# Patient Record
Sex: FEMALE | Race: WHITE | NOT HISPANIC OR LATINO | Employment: UNEMPLOYED | URBAN - METROPOLITAN AREA
[De-identification: names, ages, dates, MRNs, and addresses within clinical notes are randomized per-mention and may not be internally consistent; named-entity substitution may affect disease eponyms.]

---

## 2023-08-24 LAB
LV EF: 74 %
LVEF MODALITY: NORMAL

## 2023-10-04 ENCOUNTER — TELEPHONE (OUTPATIENT)
Dept: CARDIOLOGY | Facility: CLINIC | Age: 58
End: 2023-10-04

## 2023-10-04 ENCOUNTER — LAB (OUTPATIENT)
Dept: LAB | Facility: LAB | Age: 58
End: 2023-10-04
Payer: OTHER GOVERNMENT

## 2023-10-04 DIAGNOSIS — I82.409 ACUTE EMBOLISM AND THROMBOSIS OF UNSPECIFIED DEEP VEINS OF UNSPECIFIED LOWER EXTREMITY (MULTI): ICD-10-CM

## 2023-10-04 DIAGNOSIS — I47.10 SUPRAVENTRICULAR TACHYCARDIA, UNSPECIFIED (CMS-HCC): Primary | ICD-10-CM

## 2023-10-04 DIAGNOSIS — R06.02 SHORTNESS OF BREATH: ICD-10-CM

## 2023-10-04 DIAGNOSIS — I51.7 CARDIOMEGALY: ICD-10-CM

## 2023-10-04 DIAGNOSIS — I10 ESSENTIAL (PRIMARY) HYPERTENSION: ICD-10-CM

## 2023-10-04 LAB
ANION GAP SERPL CALC-SCNC: 11 MMOL/L (ref 10–20)
BUN SERPL-MCNC: 22 MG/DL (ref 6–23)
CALCIUM SERPL-MCNC: 9.5 MG/DL (ref 8.6–10.3)
CHLORIDE SERPL-SCNC: 101 MMOL/L (ref 98–107)
CO2 SERPL-SCNC: 27 MMOL/L (ref 21–32)
CREAT SERPL-MCNC: 0.74 MG/DL (ref 0.5–1.05)
ERYTHROCYTE [DISTWIDTH] IN BLOOD BY AUTOMATED COUNT: 12.3 % (ref 11.5–14.5)
GFR SERPL CREATININE-BSD FRML MDRD: >90 ML/MIN/1.73M*2
GLUCOSE SERPL-MCNC: 100 MG/DL (ref 74–99)
HCT VFR BLD AUTO: 38.9 % (ref 36–46)
HGB BLD-MCNC: 13.2 G/DL (ref 12–16)
MAGNESIUM SERPL-MCNC: 1.99 MG/DL (ref 1.6–2.4)
MCH RBC QN AUTO: 30.3 PG (ref 26–34)
MCHC RBC AUTO-ENTMCNC: 33.9 G/DL (ref 32–36)
MCV RBC AUTO: 89 FL (ref 80–100)
NRBC BLD-RTO: 0 /100 WBCS (ref 0–0)
PLATELET # BLD AUTO: 303 X10*3/UL (ref 150–450)
PMV BLD AUTO: 9.6 FL (ref 7.5–11.5)
POTASSIUM SERPL-SCNC: 4.4 MMOL/L (ref 3.5–5.3)
RBC # BLD AUTO: 4.35 X10*6/UL (ref 4–5.2)
SODIUM SERPL-SCNC: 135 MMOL/L (ref 136–145)
TSH SERPL-ACNC: 0.95 MIU/L (ref 0.44–3.98)
WBC # BLD AUTO: 5.8 X10*3/UL (ref 4.4–11.3)

## 2023-10-04 PROCEDURE — 36415 COLL VENOUS BLD VENIPUNCTURE: CPT

## 2023-10-04 NOTE — TELEPHONE ENCOUNTER
Pt calls in requesting if Dr. Rolan Ramires MD, [facility], ordered any labs prior to next OV. Left message on pt's voicemail stating lab orders in system and can be seen by any  facility. Dianne

## 2023-10-06 ENCOUNTER — ANCILLARY PROCEDURE (OUTPATIENT)
Dept: RADIOLOGY | Facility: CLINIC | Age: 58
End: 2023-10-06
Payer: OTHER GOVERNMENT

## 2023-10-06 DIAGNOSIS — I47.10 SUPRAVENTRICULAR TACHYCARDIA (CMS-HCC): ICD-10-CM

## 2023-10-06 DIAGNOSIS — R06.02 SHORTNESS OF BREATH: ICD-10-CM

## 2023-10-06 DIAGNOSIS — E66.3 OVERWEIGHT: ICD-10-CM

## 2023-10-06 DIAGNOSIS — R07.9 CHEST PAIN, UNSPECIFIED: ICD-10-CM

## 2023-10-06 DIAGNOSIS — I82.409 ACUTE EMBOLISM AND THROMBOSIS OF UNSPECIFIED DEEP VEINS OF UNSPECIFIED LOWER EXTREMITY (MULTI): ICD-10-CM

## 2023-10-06 PROCEDURE — 75565 CARD MRI VELOC FLOW MAPPING: CPT | Performed by: INTERNAL MEDICINE

## 2023-10-06 PROCEDURE — A9575 INJ GADOTERATE MEGLUMI 0.1ML: HCPCS | Performed by: INTERNAL MEDICINE

## 2023-10-06 PROCEDURE — 75561 CARDIAC MRI FOR MORPH W/DYE: CPT | Performed by: INTERNAL MEDICINE

## 2023-10-06 PROCEDURE — 75571 CT HRT W/O DYE W/CA TEST: CPT

## 2023-10-06 PROCEDURE — 2550000001 HC RX 255 CONTRASTS: Performed by: INTERNAL MEDICINE

## 2023-10-06 PROCEDURE — 75561 CARDIAC MRI FOR MORPH W/DYE: CPT

## 2023-10-06 RX ORDER — GADOTERATE MEGLUMINE 376.9 MG/ML
30 INJECTION INTRAVENOUS
Status: COMPLETED | OUTPATIENT
Start: 2023-10-06 | End: 2023-10-06

## 2023-10-06 RX ADMIN — GADOTERATE MEGLUMINE 30 ML: 376.9 INJECTION INTRAVENOUS at 09:43

## 2023-10-09 PROBLEM — M76.60 ACHILLES TENDINITIS: Status: ACTIVE | Noted: 2023-10-09

## 2023-10-09 PROBLEM — F41.9 ANXIETY: Status: ACTIVE | Noted: 2023-10-09

## 2023-10-09 PROBLEM — N20.0 NEPHROLITHIASIS: Status: ACTIVE | Noted: 2023-07-26

## 2023-10-09 PROBLEM — N95.0 PMB (POSTMENOPAUSAL BLEEDING): Status: ACTIVE | Noted: 2023-01-10

## 2023-10-09 PROBLEM — N89.8 VAGINAL DRYNESS: Status: ACTIVE | Noted: 2018-09-19

## 2023-10-09 PROBLEM — K21.9 GASTROESOPHAGEAL REFLUX DISEASE WITHOUT ESOPHAGITIS: Status: ACTIVE | Noted: 2019-05-06

## 2023-10-09 PROBLEM — M19.90 ARTHRITIS: Status: ACTIVE | Noted: 2023-10-09

## 2023-10-09 PROBLEM — H93.19 TINNITUS: Status: ACTIVE | Noted: 2023-10-09

## 2023-10-09 PROBLEM — R27.8 MUSCULAR INCOORDINATION: Status: ACTIVE | Noted: 2023-02-09

## 2023-10-09 PROBLEM — H90.3 SENSORINEURAL HEARING LOSS, BILATERAL: Status: ACTIVE | Noted: 2020-10-02

## 2023-10-09 PROBLEM — M24.20 EAGLE'S SYNDROME: Status: ACTIVE | Noted: 2023-10-09

## 2023-10-09 PROBLEM — F41.8 DEPRESSION WITH ANXIETY: Status: ACTIVE | Noted: 2019-05-06

## 2023-10-09 PROBLEM — N92.4 EXCESSIVE BLEEDING IN PREMENOPAUSAL PERIOD: Status: ACTIVE | Noted: 2018-09-19

## 2023-10-09 RX ORDER — OMEPRAZOLE 20 MG/1
1 CAPSULE, DELAYED RELEASE ORAL 2 TIMES DAILY
COMMUNITY
Start: 2023-08-28 | End: 2023-10-19 | Stop reason: SDUPTHER

## 2023-10-09 RX ORDER — APIXABAN 5 MG/1
1 TABLET, FILM COATED ORAL 2 TIMES DAILY
COMMUNITY
Start: 2023-08-27 | End: 2023-10-19 | Stop reason: ALTCHOICE

## 2023-10-09 RX ORDER — DILTIAZEM HYDROCHLORIDE 120 MG/1
120 CAPSULE, COATED, EXTENDED RELEASE ORAL
COMMUNITY
Start: 2023-08-08 | End: 2023-10-19

## 2023-10-09 RX ORDER — SERTRALINE HYDROCHLORIDE 100 MG/1
1 TABLET, FILM COATED ORAL DAILY
COMMUNITY
Start: 2020-05-05

## 2023-10-09 RX ORDER — OXYBUTYNIN CHLORIDE 10 MG/1
1 TABLET, EXTENDED RELEASE ORAL DAILY
COMMUNITY
Start: 2022-12-12 | End: 2023-10-19 | Stop reason: ALTCHOICE

## 2023-10-09 RX ORDER — OMEPRAZOLE 20 MG/1
20 TABLET, DELAYED RELEASE ORAL
COMMUNITY
Start: 2019-01-23 | End: 2023-10-19 | Stop reason: ALTCHOICE

## 2023-10-09 RX ORDER — SODIUM CHLORIDE 0.9 % (FLUSH) 0.9 %
10 SYRINGE (ML) INJECTION
COMMUNITY
Start: 2023-07-03 | End: 2023-10-19 | Stop reason: ALTCHOICE

## 2023-10-09 RX ORDER — ESTRADIOL 0.1 MG/G
CREAM VAGINAL DAILY
COMMUNITY
Start: 2022-11-22 | End: 2023-10-19 | Stop reason: ALTCHOICE

## 2023-10-09 RX ORDER — PREDNISONE 10 MG/1
10 TABLET ORAL
COMMUNITY
Start: 2022-07-29 | End: 2023-10-19 | Stop reason: ALTCHOICE

## 2023-10-10 ENCOUNTER — APPOINTMENT (OUTPATIENT)
Dept: CARDIOLOGY | Facility: CLINIC | Age: 58
End: 2023-10-10
Payer: OTHER GOVERNMENT

## 2023-10-18 PROBLEM — I10 ESSENTIAL HYPERTENSION: Status: ACTIVE | Noted: 2023-10-18

## 2023-10-18 PROBLEM — R07.9 CHEST PAIN: Status: ACTIVE | Noted: 2023-10-18

## 2023-10-18 PROBLEM — R00.2 PALPITATIONS: Status: ACTIVE | Noted: 2023-10-18

## 2023-10-18 PROBLEM — R06.09 DOE (DYSPNEA ON EXERTION): Status: ACTIVE | Noted: 2023-10-18

## 2023-10-18 PROBLEM — I82.462: Status: ACTIVE | Noted: 2023-10-18

## 2023-10-18 PROBLEM — I51.7 RIGHT VENTRICULAR ENLARGEMENT: Status: ACTIVE | Noted: 2023-10-18

## 2023-10-18 PROBLEM — I47.10 SVT (SUPRAVENTRICULAR TACHYCARDIA) (CMS-HCC): Status: ACTIVE | Noted: 2023-10-18

## 2023-10-18 PROBLEM — Z79.01 LONG TERM CURRENT USE OF ANTICOAGULANT THERAPY: Status: ACTIVE | Noted: 2023-10-18

## 2023-10-18 NOTE — PROGRESS NOTES
CARDIOLOGY OFFICE NOTE    Date:   10/19/2023      Patient:    Rosie Sepulveda      YOB: 1965  MRN:    [id number]     Reason for Visit: Follow up for Testing        HPI:     Rosie Sepulveda was seen in cardiac evaluation at the  Cardiology office October 19, 2023.      The patients problems are listed as in the impression above.    Electronic medical records reviewed.    Patient returns.  Is feeling well when she is taking her medication.  She states that she does occasionally miss her diltiazem and notes that she has more shortness of breath and has faster heart rates.  She is exercising regularly otherwise without limitations.    Overall she feels improved.    Patient denies Chest Pain, Lightheadedness, Dizziness, TIA or CVA symptoms.  No CHF or Edema.  No Palpitations.  No GI,  or Bleeding Issues. No Recent Fever or Chills.     Cardiovascular and general review of systems is otherwise negative.    A 14-system review is otherwise negative, other than noted.     .  IMPRESSION:      Cardiovascular status stable  Palpitations  Lightheadedness/dizziness  Dyspnea on exertion  Chest pain, resolved  Vision changes, resolved  Headaches, resolved  Tachycardia, resolved  Paroxysmal supraventricular tachycardia by cardiac monitoring 7/2023  Deep venous thrombosis, left, 4/2023, resolved by vascular ultrasound 8/2023  CT scan of the chest negative for pulmonary embolism 8/2023.  Right ventricular enlargement, mild by CT scan of the chest 8/2023.  Normal by MR cardiac 10/2023.  Long-term anticoagulation, Eliquis  Hypertension  Normal LV systolic function ejection fraction 59%, echocardiogram May 2023  Negative Lexiscan Myoview perfusion study for ischemia or MI, LVEF 74% 8/2023..  Cardiac MR 10/2023.  Negative CT calcium scoring, 1, 10/2023.  Negative pulmonary function study, 8/2023.  Negative carotid ultrasound for significant carotid disease, 8/2023  Anxiety  GERD  Eagle syndrome  Degenerative joint  disease  Ovarian cyst, right  COVID history and vaccinations  Otherwise as per assessment below.      RECOMMENDATIONS:      Patient was reassured overall.  She is encouraged to take her medications more routinely.  We will increase her diltiazem CD to 180 mg daily for better coverage.  We did discuss the possibility of ablation if breakthrough tachycardia occurs.  She understands.    She will continue her other medications.  Refills were provided.    Exercise dietary program.  Hydration.    [employer] portal use was encouraged.    We will plan to see back in 4 months with Laboratory Studies and ECG as noted below.     Patient will follow up with their primary physician for general care.    The patient knows to contact medical care earlier if need be.      ALLERGIES:     Allergies   Allergen Reactions    Amoxicillin Unknown    Trimethoprim Unknown        MEDICATIONS:     Current Outpatient Medications   Medication Instructions    dilTIAZem CD (CARDIZEM CD) 180 mg, oral, Daily    omeprazole (PRILOSEC) 20 mg, oral, 2 times daily    sertraline (Zoloft) 100 mg tablet 1 tablet, oral, Daily       ELECTROCARDIOGRAM:      Sinus rhythm, left ventricular hypertrophy, rate 68.  No acute changes.    CARDIAC TESTING:      Cardiac MR: Normal.  Right ventricle normal size and function.  See full report for details.    CT calcium scorin.    LABORATORY DATA:      No new testing, otherwise as noted below.    All above testing was personally reviewed.    VITALS:     Vitals:    10/19/23 0902   BP: 118/77   Pulse: 69   SpO2: 97%       Wt Readings from Last 4 Encounters:   10/19/23 76.4 kg (168 lb 6.4 oz)   10/06/23 74.8 kg (165 lb)       PHYSICAL EXAMINATION:      General: No acute distress. Vital signs as noted. Alert and oriented.  Head And Neck Examination: No jugular venous distention, no carotid bruits, no mass. Carotid upstrokes preserved. Oral mucosa moist. No xanthelasma. Head and neck examination otherwise unremarkable.  Lungs:  Clear to auscultation and percussion. No wheezes, no rales, and no rhonchi.  Chest: Excursion appeared to be normal. No chest wall tenderness on palpation.  Heart: Normal S1 and S2. No S3. No S4. No rub. Grade 1/6 systolic murmur, best heard at the left sternal border. Point of maximal impulse was within normal limits.  Abdomen: Soft. Nontender. No organomegaly. No bruits. No masses.  Extremities: No bipedal edema. No clubbing. No cyanosis. Pulses are strong throughout. No bruits.  Musculoskeletal Exam: No ulcers, otherwise unremarkable.  Neuro: Neurologically appeared grossly intact.    LAB:     CBC:   Lab Results   Component Value Date    WBC 5.8 10/04/2023    RBC 4.35 10/04/2023    HGB 13.2 10/04/2023    HCT 38.9 10/04/2023     10/04/2023        CMP:    Lab Results   Component Value Date     (L) 10/04/2023    K 4.4 10/04/2023     10/04/2023    CO2 27 10/04/2023    BUN 22 10/04/2023    CREATININE 0.74 10/04/2023    GLUCOSE 100 (H) 10/04/2023    CALCIUM 9.5 10/04/2023       Magnesium:    Lab Results   Component Value Date    MG 1.99 10/04/2023       TSH:    Lab Results   Component Value Date    TSH 0.95 10/04/2023       PROBLEM LIST:     Patient Active Problem List   Diagnosis    Achilles tendinitis    Anxiety    Osteoarthritis    Depression with anxiety    Eagle's syndrome    Excessive bleeding in premenopausal period    Gastroesophageal reflux disease without esophagitis    Muscular incoordination    Nephrolithiasis    PMB (postmenopausal bleeding)    Rectocele    Sensorineural hearing loss, bilateral    Tinnitus    Vaginal dryness    Palpitations    GOMEZ (dyspnea on exertion)    Chest pain    Deep vein thrombosis (DVT) of calf muscle vein of left lower extremity (CMS/HCC)    SVT (supraventricular tachycardia)    Right ventricular enlargement    Essential hypertension    Long term current use of anticoagulant therapy             Rolan Ramires MD, FACC   [facility] / NO /   Cardiology      Of Note:  Dragon voice recognition dictation software was utilized partially in the preparation of this note, therefore, inaccuracies in spelling, word choice and punctuation may have occurred which were not recognized the time of signing.    ----

## 2023-10-19 ENCOUNTER — APPOINTMENT (OUTPATIENT)
Dept: CARDIOLOGY | Facility: CLINIC | Age: 58
End: 2023-10-19
Payer: OTHER GOVERNMENT

## 2023-10-19 ENCOUNTER — OFFICE VISIT (OUTPATIENT)
Dept: CARDIOLOGY | Facility: CLINIC | Age: 58
End: 2023-10-19
Payer: OTHER GOVERNMENT

## 2023-10-19 VITALS
SYSTOLIC BLOOD PRESSURE: 118 MMHG | HEART RATE: 69 BPM | WEIGHT: 168.4 LBS | DIASTOLIC BLOOD PRESSURE: 77 MMHG | HEIGHT: 66 IN | BODY MASS INDEX: 27.06 KG/M2 | OXYGEN SATURATION: 97 %

## 2023-10-19 DIAGNOSIS — M24.20 EAGLE'S SYNDROME: ICD-10-CM

## 2023-10-19 DIAGNOSIS — Z79.01 LONG TERM CURRENT USE OF ANTICOAGULANT THERAPY: ICD-10-CM

## 2023-10-19 DIAGNOSIS — R06.09 DOE (DYSPNEA ON EXERTION): ICD-10-CM

## 2023-10-19 DIAGNOSIS — K21.9 GASTROESOPHAGEAL REFLUX DISEASE WITHOUT ESOPHAGITIS: Primary | ICD-10-CM

## 2023-10-19 DIAGNOSIS — I82.462 DEEP VEIN THROMBOSIS (DVT) OF CALF MUSCLE VEIN OF LEFT LOWER EXTREMITY, UNSPECIFIED CHRONICITY (MULTI): ICD-10-CM

## 2023-10-19 DIAGNOSIS — I47.10 SVT (SUPRAVENTRICULAR TACHYCARDIA) (CMS-HCC): ICD-10-CM

## 2023-10-19 DIAGNOSIS — Z86.16 HISTORY OF COVID-19: ICD-10-CM

## 2023-10-19 DIAGNOSIS — R00.2 PALPITATIONS: ICD-10-CM

## 2023-10-19 DIAGNOSIS — M19.90 OSTEOARTHRITIS, UNSPECIFIED OSTEOARTHRITIS TYPE, UNSPECIFIED SITE: ICD-10-CM

## 2023-10-19 DIAGNOSIS — I51.7 RIGHT VENTRICULAR ENLARGEMENT: ICD-10-CM

## 2023-10-19 DIAGNOSIS — I10 ESSENTIAL HYPERTENSION: ICD-10-CM

## 2023-10-19 DIAGNOSIS — F41.9 ANXIETY: ICD-10-CM

## 2023-10-19 DIAGNOSIS — R07.9 CHEST PAIN, UNSPECIFIED TYPE: ICD-10-CM

## 2023-10-19 DIAGNOSIS — K21.9 GASTROESOPHAGEAL REFLUX DISEASE WITHOUT ESOPHAGITIS: ICD-10-CM

## 2023-10-19 PROCEDURE — 1036F TOBACCO NON-USER: CPT | Performed by: INTERNAL MEDICINE

## 2023-10-19 PROCEDURE — 3078F DIAST BP <80 MM HG: CPT | Performed by: INTERNAL MEDICINE

## 2023-10-19 PROCEDURE — 99214 OFFICE O/P EST MOD 30 MIN: CPT | Performed by: INTERNAL MEDICINE

## 2023-10-19 PROCEDURE — 3074F SYST BP LT 130 MM HG: CPT | Performed by: INTERNAL MEDICINE

## 2023-10-19 PROCEDURE — 93000 ELECTROCARDIOGRAM COMPLETE: CPT | Performed by: INTERNAL MEDICINE

## 2023-10-19 RX ORDER — OMEPRAZOLE 20 MG/1
20 CAPSULE, DELAYED RELEASE ORAL 2 TIMES DAILY
Qty: 180 CAPSULE | Refills: 3 | Status: SHIPPED | OUTPATIENT
Start: 2023-10-19 | End: 2024-10-18

## 2023-10-19 RX ORDER — DILTIAZEM HYDROCHLORIDE 180 MG/1
180 CAPSULE, COATED, EXTENDED RELEASE ORAL DAILY
Qty: 30 CAPSULE | Refills: 11 | Status: SHIPPED | OUTPATIENT
Start: 2023-10-19 | End: 2023-10-19 | Stop reason: SDUPTHER

## 2023-10-19 RX ORDER — DILTIAZEM HYDROCHLORIDE 180 MG/1
180 CAPSULE, COATED, EXTENDED RELEASE ORAL DAILY
Qty: 90 CAPSULE | Refills: 3 | Status: SHIPPED | OUTPATIENT
Start: 2023-10-19 | End: 2023-11-21 | Stop reason: DRUGHIGH

## 2023-10-19 ASSESSMENT — PAIN SCALES - GENERAL: PAINLEVEL: 0-NO PAIN

## 2023-10-19 NOTE — PATIENT INSTRUCTIONS
Exercise diet weight loss program.    Hydrate    Use My Chart portal for reviewing records, testing and contacting office.

## 2023-11-07 ENCOUNTER — TELEPHONE (OUTPATIENT)
Dept: CARDIOLOGY | Facility: CLINIC | Age: 58
End: 2023-11-07

## 2023-11-07 DIAGNOSIS — I10 ESSENTIAL HYPERTENSION: Primary | ICD-10-CM

## 2023-11-07 DIAGNOSIS — R00.2 PALPITATIONS: ICD-10-CM

## 2023-11-07 NOTE — TELEPHONE ENCOUNTER
Patient called and wanted to go back to 120 mg instead of the 180 mg because this mg is causing constipation. Patient also advised the result are still the same. Call back number [phone]

## 2023-11-21 NOTE — TELEPHONE ENCOUNTER
Patient left vmail in [place] office stating she would like to continue on Diltiazem 120 mg daily, RX [facility]  [facility]. Akila Staples MA

## 2023-11-27 RX ORDER — DILTIAZEM HYDROCHLORIDE 120 MG/1
120 CAPSULE, COATED, EXTENDED RELEASE ORAL DAILY
Qty: 30 CAPSULE | Refills: 11 | Status: SHIPPED | OUTPATIENT
Start: 2023-11-27 | End: 2024-02-15 | Stop reason: SDUPTHER

## 2024-01-03 ASSESSMENT — DERMATOLOGY QUALITY OF LIFE (QOL) ASSESSMENT
RATE HOW BOTHERED YOU ARE BY EFFECTS OF YOUR SKIN PROBLEMS ON YOUR ACTIVITIES (EG, GOING OUT, ACCOMPLISHING WHAT YOU WANT, WORK ACTIVITIES OR YOUR RELATIONSHIPS WITH OTHERS): 1
RATE HOW EMOTIONALLY BOTHERED YOU ARE BY YOUR SKIN PROBLEM (FOR EXAMPLE, WORRY, EMBARRASSMENT, FRUSTRATION): 0 - NEVER BOTHERED
RATE HOW BOTHERED YOU ARE BY EFFECTS OF YOUR SKIN PROBLEMS ON YOUR ACTIVITIES (EG, GOING OUT, ACCOMPLISHING WHAT YOU WANT, WORK ACTIVITIES OR YOUR RELATIONSHIPS WITH OTHERS): 1
RATE HOW BOTHERED YOU ARE BY SYMPTOMS OF YOUR SKIN PROBLEM (EG, ITCHING, STINGING BURNING, HURTING OR SKIN IRRITATION): 0 - NEVER BOTHERED
RATE HOW EMOTIONALLY BOTHERED YOU ARE BY YOUR SKIN PROBLEM (FOR EXAMPLE, WORRY, EMBARRASSMENT, FRUSTRATION): 0 - NEVER BOTHERED
DATE THE QUALITY-OF-LIFE ASSESSMENT WAS COMPLETED: 66842
WHAT SINGLE SKIN CONDITION LISTED BELOW IS THE PATIENT ANSWERING THE QUALITY-OF-LIFE ASSESSMENT QUESTIONS ABOUT: NONE OF THE ABOVE
RATE HOW BOTHERED YOU ARE BY SYMPTOMS OF YOUR SKIN PROBLEM (EG, ITCHING, STINGING BURNING, HURTING OR SKIN IRRITATION): 0 - NEVER BOTHERED
WHAT SINGLE SKIN CONDITION LISTED BELOW IS THE PATIENT ANSWERING THE QUALITY-OF-LIFE ASSESSMENT QUESTIONS ABOUT: NONE OF THE ABOVE

## 2024-01-04 ENCOUNTER — OFFICE VISIT (OUTPATIENT)
Dept: DERMATOLOGY | Facility: CLINIC | Age: 59
End: 2024-01-04
Payer: OTHER GOVERNMENT

## 2024-01-04 DIAGNOSIS — L81.4 LENTIGO: Primary | ICD-10-CM

## 2024-01-04 DIAGNOSIS — D18.00 HEMANGIOMA, UNSPECIFIED SITE: ICD-10-CM

## 2024-01-04 DIAGNOSIS — L82.1 SEBORRHEIC KERATOSIS: ICD-10-CM

## 2024-01-04 DIAGNOSIS — Z12.83 ENCOUNTER FOR SCREENING FOR MALIGNANT NEOPLASM OF SKIN: ICD-10-CM

## 2024-01-04 PROCEDURE — 1036F TOBACCO NON-USER: CPT | Performed by: DERMATOLOGY

## 2024-01-04 PROCEDURE — 99203 OFFICE O/P NEW LOW 30 MIN: CPT | Performed by: DERMATOLOGY

## 2024-01-04 ASSESSMENT — DERMATOLOGY PATIENT ASSESSMENT
DO YOU HAVE ANY NEW OR CHANGING LESIONS: YES
DO YOU USE A TANNING BED: YES, PREVIOUSLY
HAVE YOU HAD OR DO YOU HAVE VASCULAR DISEASE: NO
ARE YOU AN ORGAN TRANSPLANT RECIPIENT: NO
HAVE YOU HAD OR DO YOU HAVE A STAPH INFECTION: NO
DO YOU HAVE IRREGULAR MENSTRUAL CYCLES: NO
DO YOU USE SUNSCREEN: OCCASIONALLY
ARE YOU TRYING TO GET PREGNANT: NO
ARE YOU ON BIRTH CONTROL: NO

## 2024-01-04 ASSESSMENT — DERMATOLOGY QUALITY OF LIFE (QOL) ASSESSMENT
DATE THE QUALITY-OF-LIFE ASSESSMENT WAS COMPLETED: 66843
RATE HOW BOTHERED YOU ARE BY SYMPTOMS OF YOUR SKIN PROBLEM (EG, ITCHING, STINGING BURNING, HURTING OR SKIN IRRITATION): 0 - NEVER BOTHERED
WHAT SINGLE SKIN CONDITION LISTED BELOW IS THE PATIENT ANSWERING THE QUALITY-OF-LIFE ASSESSMENT QUESTIONS ABOUT: NONE OF THE ABOVE
ARE THERE EXCLUSIONS OR EXCEPTIONS FOR THE QUALITY OF LIFE ASSESSMENT: NO
RATE HOW BOTHERED YOU ARE BY EFFECTS OF YOUR SKIN PROBLEMS ON YOUR ACTIVITIES (EG, GOING OUT, ACCOMPLISHING WHAT YOU WANT, WORK ACTIVITIES OR YOUR RELATIONSHIPS WITH OTHERS): 1
RATE HOW EMOTIONALLY BOTHERED YOU ARE BY YOUR SKIN PROBLEM (FOR EXAMPLE, WORRY, EMBARRASSMENT, FRUSTRATION): 0 - NEVER BOTHERED

## 2024-01-04 ASSESSMENT — ITCH NUMERIC RATING SCALE: HOW SEVERE IS YOUR ITCHING?: 0

## 2024-01-04 NOTE — PROGRESS NOTES
Subjective   Rosie Sepulveda is a 58 y.o. female who presents for the following: Skin Check.    Skin Cancer Screening  She has a history of moderate sun exposure. She is in the sun occasionally. She uses sunscreen occasionally. She reports no skin symptoms. Her moles are not changing.    Spots that concern her: Face and left shoulder        Objective   Well appearing patient in no apparent distress; mood and affect are within normal limits.    A full examination was performed including scalp, head, eyes, ears, nose, lips, neck, chest, axillae, abdomen, back, buttocks, bilateral upper extremities, bilateral lower extremities, hands, feet, fingers, toes, fingernails, and toenails. All findings within normal limits unless otherwise noted below.    Scattered tan macules in sun-exposed areas.    Stuck on verrucous, tan-brown papules and plaques.      Scattered cherry-red papule(s).      Assessment/Plan   Lentigo    The ABCDEs of melanoma and warning signs of non-melanoma skin cancer were discussed with patient and patient expressed understanding. Sun protection and use of at least SPF 30 discussed with patient. Pt instructed to reapply every 2 hours.     Seborrheic keratosis    Hemangioma, unspecified site    Follow up in 2-3 years or sooner as needed.   Scribe Attestation  By signing my name below, IReshma Scribe   attest that this documentation has been prepared under the direction and in the presence of Bethanie English MD.

## 2024-02-15 ENCOUNTER — OFFICE VISIT (OUTPATIENT)
Dept: CARDIOLOGY | Facility: CLINIC | Age: 59
End: 2024-02-15
Payer: OTHER GOVERNMENT

## 2024-02-15 VITALS
HEART RATE: 71 BPM | WEIGHT: 169 LBS | OXYGEN SATURATION: 98 % | DIASTOLIC BLOOD PRESSURE: 83 MMHG | BODY MASS INDEX: 27.16 KG/M2 | HEIGHT: 66 IN | SYSTOLIC BLOOD PRESSURE: 126 MMHG

## 2024-02-15 DIAGNOSIS — K21.9 GASTROESOPHAGEAL REFLUX DISEASE WITHOUT ESOPHAGITIS: ICD-10-CM

## 2024-02-15 DIAGNOSIS — F41.9 ANXIETY: ICD-10-CM

## 2024-02-15 DIAGNOSIS — Z79.01 LONG TERM CURRENT USE OF ANTICOAGULANT THERAPY: ICD-10-CM

## 2024-02-15 DIAGNOSIS — M24.20 EAGLE'S SYNDROME: ICD-10-CM

## 2024-02-15 DIAGNOSIS — M19.90 OSTEOARTHRITIS, UNSPECIFIED OSTEOARTHRITIS TYPE, UNSPECIFIED SITE: ICD-10-CM

## 2024-02-15 DIAGNOSIS — R06.09 DOE (DYSPNEA ON EXERTION): ICD-10-CM

## 2024-02-15 DIAGNOSIS — R00.2 PALPITATIONS: ICD-10-CM

## 2024-02-15 DIAGNOSIS — I47.10 SVT (SUPRAVENTRICULAR TACHYCARDIA) (CMS-HCC): Primary | ICD-10-CM

## 2024-02-15 DIAGNOSIS — I51.7 RIGHT VENTRICULAR ENLARGEMENT: ICD-10-CM

## 2024-02-15 DIAGNOSIS — I10 ESSENTIAL HYPERTENSION: ICD-10-CM

## 2024-02-15 DIAGNOSIS — R07.9 CHEST PAIN, UNSPECIFIED TYPE: ICD-10-CM

## 2024-02-15 DIAGNOSIS — G56.03 BILATERAL CARPAL TUNNEL SYNDROME: ICD-10-CM

## 2024-02-15 DIAGNOSIS — I82.462 DEEP VEIN THROMBOSIS (DVT) OF CALF MUSCLE VEIN OF LEFT LOWER EXTREMITY, UNSPECIFIED CHRONICITY (MULTI): ICD-10-CM

## 2024-02-15 DIAGNOSIS — K59.00 CONSTIPATION, UNSPECIFIED CONSTIPATION TYPE: ICD-10-CM

## 2024-02-15 DIAGNOSIS — Z86.16 HISTORY OF COVID-19: ICD-10-CM

## 2024-02-15 PROCEDURE — 3074F SYST BP LT 130 MM HG: CPT | Performed by: INTERNAL MEDICINE

## 2024-02-15 PROCEDURE — 93000 ELECTROCARDIOGRAM COMPLETE: CPT | Performed by: INTERNAL MEDICINE

## 2024-02-15 PROCEDURE — 3079F DIAST BP 80-89 MM HG: CPT | Performed by: INTERNAL MEDICINE

## 2024-02-15 PROCEDURE — 99213 OFFICE O/P EST LOW 20 MIN: CPT | Performed by: INTERNAL MEDICINE

## 2024-02-15 PROCEDURE — 1036F TOBACCO NON-USER: CPT | Performed by: INTERNAL MEDICINE

## 2024-02-15 RX ORDER — DOCUSATE SODIUM 100 MG/1
100 CAPSULE, LIQUID FILLED ORAL 2 TIMES DAILY
Qty: 180 CAPSULE | Refills: 3 | Status: SHIPPED | OUTPATIENT
Start: 2024-02-15 | End: 2025-02-14

## 2024-02-15 RX ORDER — DILTIAZEM HYDROCHLORIDE 120 MG/1
120 CAPSULE, COATED, EXTENDED RELEASE ORAL DAILY
Qty: 30 CAPSULE | Refills: 11 | Status: SHIPPED | OUTPATIENT
Start: 2024-02-15 | End: 2025-02-14

## 2024-02-15 NOTE — PROGRESS NOTES
CARDIOLOGY OFFICE NOTE    Date:   2/15/2024    Patient:    Rosie Sepulveda    YOB: 1965    Primary Physician: Yanira Morin MD       Reason for Visit: 4 month follow up       HPI:     Rosie Sepulveda was seen in cardiac evaluation at the  Cardiology office February 15, 2024.      The patients problems are listed as in the impression below.    Electronic medical records reviewed.    Patient returns.  Overall she states that she feels well.  She has resolution for the most part of her palpitations.  She only has them occasionally.  She does have some constipation with her diltiazem.  She tried to come off of the diltiazem and was actually on a higher dose of 180 but had recurrent symptomatology.  She restarted the 120 diltiazem CD and has done well overall.  She still has some constipation but is changing her diet and accommodating.    She has no other cardiovascular complaints.    Patient denies Chest Pain, SOB, Lightheadedness, Dizziness, TIA or CVA symptoms.  No CHF or Edema.  No GI,  or Bleeding Issues. No Recent Fever or Chills.     Cardiovascular and general review of systems is otherwise negative.    A 14-system review is otherwise negative, other than noted.     PHYSICAL EXAMINATION:      Vitals:    02/15/24 0755   BP: 126/83   Pulse: 71   SpO2: 98%     General: No acute distress. Vital signs as noted. Alert and oriented.  Head And Neck Examination: No jugular venous distention, no carotid bruits, no mass. Carotid upstrokes preserved. Oral mucosa moist. No xanthelasma. Head and neck examination otherwise unremarkable.  Lungs: Clear to auscultation and percussion. No wheezes, no rales, and no rhonchi.  Chest: Excursion appeared to be normal. No chest wall tenderness on palpation.  Heart: Normal S1 and S2. No S3. No S4. No rub. Grade 1/6 systolic murmur, best heard at the left sternal border. Point of maximal impulse was within normal limits.  Abdomen: Soft. Nontender. No  organomegaly. No bruits. No masses.  Extremities: No bipedal edema. No clubbing. No cyanosis. Pulses are strong throughout. No bruits.  Musculoskeletal Exam: No ulcers, otherwise unremarkable.  Neuro: Neurologically appeared grossly intact.    IMPRESSION:      Cardiovascular status stable  Palpitations  Paroxysmal supraventricular tachycardia by cardiac monitoring 7/2023  Deep venous thrombosis, left, 4/2023, resolved by vascular ultrasound 8/2023  CT scan of the chest negative for pulmonary embolism 8/2023.  Right ventricular enlargement, mild by CT scan of the chest 8/2023.  Normal by MR cardiac 10/2023.  Hypertension  Normal LV systolic function ejection fraction 59%, echocardiogram May 2023  Negative Lexiscan Myoview perfusion study for ischemia or MI, LVEF 74% 8/2023..  Cardiac MR 10/2023.  Negative CT calcium scoring, 1, 10/2023.  Negative pulmonary function study, 8/2023.  Negative carotid ultrasound for significant carotid disease, 8/2023  Anxiety  GERD  Constipation  Eagle syndrome  Degenerative joint disease  Carpal tunnel disease, mild, bilateral  Ovarian cyst, right  COVID history and vaccinations  Otherwise as per assessment below.    RECOMMENDATIONS:      Rosie overall states that she feels better.  Is tolerating her current dose of diltiazem with the exception of some mild constipation.  Would suggest adding Colace 100 mg twice daily and increasing her fiber and hydration in her diet.  She will continue her diltiazem 120 mg CD daily.  Refills were provided.    Exercise dietary program was encouraged.  Hydration.    [employer] portal use was encouraged.    We will plan to see back in 1 year with Laboratory Studies and ECG as ordered.     Patient will follow up with their primary physician for general care.    The patient knows to contact medical care earlier if need be.      ALLERGIES:     Allergies   Allergen Reactions    Amoxicillin Unknown    Trimethoprim Unknown        MEDICATIONS:     Current  Outpatient Medications   Medication Instructions    dilTIAZem CD (CARDIZEM CD) 120 mg, oral, Daily    omeprazole (PRILOSEC) 20 mg, oral, 2 times daily    sertraline (Zoloft) 100 mg tablet 1 tablet, oral, Daily       ELECTROCARDIOGRAM:      Sinus rhythm, rate 71.  No acute changes.    CARDIAC TESTING:      None    LABORATORY DATA:      CBC:   Lab Results   Component Value Date    WBC 5.8 10/04/2023    RBC 4.35 10/04/2023    HGB 13.2 10/04/2023    HCT 38.9 10/04/2023     10/04/2023        CMP:    Lab Results   Component Value Date     (L) 10/04/2023    K 4.4 10/04/2023     10/04/2023    CO2 27 10/04/2023    BUN 22 10/04/2023    CREATININE 0.74 10/04/2023    GLUCOSE 100 (H) 10/04/2023    CALCIUM 9.5 10/04/2023     Magnesium:    Lab Results   Component Value Date    MG 1.99 10/04/2023     TSH:    Lab Results   Component Value Date    TSH 0.95 10/04/2023                 PROBLEM LIST:     Patient Active Problem List   Diagnosis    Achilles tendinitis    Anxiety    Osteoarthritis    Depression with anxiety    Eagle's syndrome    Excessive bleeding in premenopausal period    Gastroesophageal reflux disease without esophagitis    Muscular incoordination    Nephrolithiasis    PMB (postmenopausal bleeding)    Rectocele    Sensorineural hearing loss, bilateral    Tinnitus    Vaginal dryness    Palpitations    GOMEZ (dyspnea on exertion)    Chest pain    Deep vein thrombosis (DVT) of calf muscle vein of left lower extremity (CMS/HCC)    SVT (supraventricular tachycardia)    Right ventricular enlargement    Essential hypertension    Long term current use of anticoagulant therapy             Rolan Ramires MD, FACC   [facility] /  Cardiology      Of Note:  [employer] voice recognition dictation software was utilized partially in the preparation of this note, therefore, inaccuracies in spelling, word choice and punctuation may have occurred which were not recognized the time of signing.    Patient was seen  and examined with total time of visit including chart preparation, rooming, and chart completion exceeding 40 minutes.      ----

## 2024-03-06 ENCOUNTER — TELEPHONE (OUTPATIENT)
Dept: CARDIOLOGY | Facility: CLINIC | Age: 59
End: 2024-03-06
Payer: OTHER GOVERNMENT

## 2024-03-06 NOTE — TELEPHONE ENCOUNTER
Patient called stating she was prescribed Prednisone 4 mg pack for 7 days. Patient read online that it can cause a interaction with Diltiazem. Patient is asking if it is ok to take both at the same time. Please advise.   Akila Staples MA

## 2024-04-03 ENCOUNTER — APPOINTMENT (OUTPATIENT)
Dept: PREADMISSION TESTING | Facility: HOSPITAL | Age: 59
End: 2024-04-03
Payer: OTHER GOVERNMENT

## 2024-04-04 ENCOUNTER — APPOINTMENT (OUTPATIENT)
Dept: PREADMISSION TESTING | Facility: HOSPITAL | Age: 59
End: 2024-04-04
Payer: OTHER GOVERNMENT

## 2024-04-05 RX ORDER — FLUTICASONE PROPIONATE 50 MCG
1 SPRAY, SUSPENSION (ML) NASAL DAILY
COMMUNITY

## 2024-04-05 RX ORDER — LORATADINE 10 MG/1
10 TABLET ORAL DAILY
COMMUNITY

## 2024-04-05 ASSESSMENT — CHADS2 SCORE
PRIOR STROKE OR TIA OR THROMBOEMBOLISM: NO
CHF: NO
CHADS2 SCORE: 0
AGE GREATER THAN OR EQUAL TO 75: NO
HYPERTENSION: NO
DIABETES: NO

## 2024-04-05 ASSESSMENT — DUKE ACTIVITY SCORE INDEX (DASI)
CAN YOU DO YARD WORK LIKE RAKING LEAVES, WEEDING OR PUSHING A MOWER: YES
CAN YOU PARTICIPATE IN MODERATE RECREATIONAL ACTIVITIES LIKE GOLF, BOWLING, DANCING, DOUBLES TENNIS OR THROWING A BASEBALL OR FOOTBALL: YES
CAN YOU DO LIGHT WORK AROUND THE HOUSE LIKE DUSTING OR WASHING DISHES: YES
CAN YOU WALK A BLOCK OR TWO ON LEVEL GROUND: YES
CAN YOU CLIMB A FLIGHT OF STAIRS OR WALK UP A HILL: YES
CAN YOU WALK INDOORS, SUCH AS AROUND YOUR HOUSE: YES
DASI METS SCORE: 9.9
CAN YOU PARTICIPATE IN STRENOUS SPORTS LIKE SWIMMING, SINGLES TENNIS, FOOTBALL, BASKETBALL, OR SKIING: YES
TOTAL_SCORE: 58.2
CAN YOU RUN A SHORT DISTANCE: YES
CAN YOU DO MODERATE WORK AROUND THE HOUSE LIKE VACUUMING, SWEEPING FLOORS OR CARRYING GROCERIES: YES
CAN YOU HAVE SEXUAL RELATIONS: YES
CAN YOU TAKE CARE OF YOURSELF (EAT, DRESS, BATHE, OR USE TOILET): YES
CAN YOU DO HEAVY WORK AROUND THE HOUSE LIKE SCRUBBING FLOORS OR LIFTING AND MOVING HEAVY FURNITURE: YES

## 2024-04-05 ASSESSMENT — LIFESTYLE VARIABLES: SMOKING_STATUS: NONSMOKER

## 2024-04-05 ASSESSMENT — ACTIVITIES OF DAILY LIVING (ADL): ADL_SCORE: 0

## 2024-04-08 ENCOUNTER — LAB (OUTPATIENT)
Dept: LAB | Facility: LAB | Age: 59
End: 2024-04-08
Payer: OTHER GOVERNMENT

## 2024-04-08 ENCOUNTER — PRE-ADMISSION TESTING (OUTPATIENT)
Dept: PREADMISSION TESTING | Facility: HOSPITAL | Age: 59
End: 2024-04-08
Payer: OTHER GOVERNMENT

## 2024-04-08 VITALS
WEIGHT: 166.45 LBS | DIASTOLIC BLOOD PRESSURE: 83 MMHG | HEART RATE: 73 BPM | SYSTOLIC BLOOD PRESSURE: 138 MMHG | RESPIRATION RATE: 16 BRPM | BODY MASS INDEX: 26.75 KG/M2 | TEMPERATURE: 97.2 F | OXYGEN SATURATION: 97 % | HEIGHT: 66 IN

## 2024-04-08 DIAGNOSIS — M75.102 ROTATOR CUFF TEAR ARTHROPATHY OF LEFT SHOULDER: ICD-10-CM

## 2024-04-08 DIAGNOSIS — R07.9 CHEST PAIN, UNSPECIFIED TYPE: ICD-10-CM

## 2024-04-08 DIAGNOSIS — F41.9 ANXIETY: ICD-10-CM

## 2024-04-08 DIAGNOSIS — M24.20 EAGLE'S SYNDROME: ICD-10-CM

## 2024-04-08 DIAGNOSIS — I82.462 DEEP VEIN THROMBOSIS (DVT) OF CALF MUSCLE VEIN OF LEFT LOWER EXTREMITY, UNSPECIFIED CHRONICITY (MULTI): ICD-10-CM

## 2024-04-08 DIAGNOSIS — K21.9 GASTROESOPHAGEAL REFLUX DISEASE WITHOUT ESOPHAGITIS: ICD-10-CM

## 2024-04-08 DIAGNOSIS — I10 ESSENTIAL HYPERTENSION: ICD-10-CM

## 2024-04-08 DIAGNOSIS — I51.7 RIGHT VENTRICULAR ENLARGEMENT: ICD-10-CM

## 2024-04-08 DIAGNOSIS — M12.812 ROTATOR CUFF TEAR ARTHROPATHY OF LEFT SHOULDER: ICD-10-CM

## 2024-04-08 DIAGNOSIS — Z01.818 PRE-OP TESTING: ICD-10-CM

## 2024-04-08 DIAGNOSIS — R00.2 PALPITATIONS: ICD-10-CM

## 2024-04-08 DIAGNOSIS — Z86.16 HISTORY OF COVID-19: ICD-10-CM

## 2024-04-08 DIAGNOSIS — Z01.818 PRE-OP TESTING: Primary | ICD-10-CM

## 2024-04-08 DIAGNOSIS — R06.09 DOE (DYSPNEA ON EXERTION): ICD-10-CM

## 2024-04-08 DIAGNOSIS — I47.10 SVT (SUPRAVENTRICULAR TACHYCARDIA) (CMS-HCC): ICD-10-CM

## 2024-04-08 DIAGNOSIS — M19.90 OSTEOARTHRITIS, UNSPECIFIED OSTEOARTHRITIS TYPE, UNSPECIFIED SITE: ICD-10-CM

## 2024-04-08 DIAGNOSIS — Z79.01 LONG TERM CURRENT USE OF ANTICOAGULANT THERAPY: ICD-10-CM

## 2024-04-08 LAB
ALBUMIN SERPL BCP-MCNC: 4.4 G/DL (ref 3.4–5)
ALP SERPL-CCNC: 57 U/L (ref 33–110)
ALT SERPL W P-5'-P-CCNC: 13 U/L (ref 7–45)
ANION GAP SERPL CALC-SCNC: 11 MMOL/L (ref 10–20)
AST SERPL W P-5'-P-CCNC: 15 U/L (ref 9–39)
BILIRUB DIRECT SERPL-MCNC: 0.1 MG/DL (ref 0–0.3)
BILIRUB SERPL-MCNC: 0.4 MG/DL (ref 0–1.2)
BUN SERPL-MCNC: 21 MG/DL (ref 6–23)
CALCIUM SERPL-MCNC: 9.4 MG/DL (ref 8.6–10.3)
CHLORIDE SERPL-SCNC: 103 MMOL/L (ref 98–107)
CHOLEST SERPL-MCNC: 213 MG/DL (ref 0–199)
CHOLESTEROL/HDL RATIO: 3.5
CO2 SERPL-SCNC: 28 MMOL/L (ref 21–32)
CREAT SERPL-MCNC: 0.69 MG/DL (ref 0.5–1.05)
EGFRCR SERPLBLD CKD-EPI 2021: >90 ML/MIN/1.73M*2
ERYTHROCYTE [DISTWIDTH] IN BLOOD BY AUTOMATED COUNT: 12.3 % (ref 11.5–14.5)
GLUCOSE SERPL-MCNC: 89 MG/DL (ref 74–99)
HCT VFR BLD AUTO: 39.5 % (ref 36–46)
HDLC SERPL-MCNC: 61.7 MG/DL
HGB BLD-MCNC: 13.3 G/DL (ref 12–16)
LDLC SERPL CALC-MCNC: 133 MG/DL
MAGNESIUM SERPL-MCNC: 1.98 MG/DL (ref 1.6–2.4)
MCH RBC QN AUTO: 29.9 PG (ref 26–34)
MCHC RBC AUTO-ENTMCNC: 33.7 G/DL (ref 32–36)
MCV RBC AUTO: 89 FL (ref 80–100)
NON HDL CHOLESTEROL: 151 MG/DL (ref 0–149)
NRBC BLD-RTO: 0 /100 WBCS (ref 0–0)
PLATELET # BLD AUTO: 318 X10*3/UL (ref 150–450)
POTASSIUM SERPL-SCNC: 4.2 MMOL/L (ref 3.5–5.3)
PROT SERPL-MCNC: 7 G/DL (ref 6.4–8.2)
RBC # BLD AUTO: 4.45 X10*6/UL (ref 4–5.2)
SODIUM SERPL-SCNC: 138 MMOL/L (ref 136–145)
TRIGL SERPL-MCNC: 91 MG/DL (ref 0–149)
TSH SERPL-ACNC: 1.52 MIU/L (ref 0.44–3.98)
VLDL: 18 MG/DL (ref 0–40)
WBC # BLD AUTO: 4.6 X10*3/UL (ref 4.4–11.3)

## 2024-04-08 PROCEDURE — 80061 LIPID PANEL: CPT

## 2024-04-08 PROCEDURE — 36415 COLL VENOUS BLD VENIPUNCTURE: CPT

## 2024-04-08 PROCEDURE — 99203 OFFICE O/P NEW LOW 30 MIN: CPT | Performed by: NURSE PRACTITIONER

## 2024-04-08 PROCEDURE — 82248 BILIRUBIN DIRECT: CPT

## 2024-04-08 PROCEDURE — 80053 COMPREHEN METABOLIC PANEL: CPT

## 2024-04-08 PROCEDURE — 84443 ASSAY THYROID STIM HORMONE: CPT

## 2024-04-08 PROCEDURE — 83735 ASSAY OF MAGNESIUM: CPT

## 2024-04-08 PROCEDURE — 85027 COMPLETE CBC AUTOMATED: CPT

## 2024-04-08 RX ORDER — AZITHROMYCIN 250 MG/1
250 TABLET, FILM COATED ORAL DAILY
COMMUNITY

## 2024-04-08 ASSESSMENT — PAIN - FUNCTIONAL ASSESSMENT: PAIN_FUNCTIONAL_ASSESSMENT: 0-10

## 2024-04-08 ASSESSMENT — PAIN SCALES - GENERAL: PAINLEVEL_OUTOF10: 0 - NO PAIN

## 2024-04-08 NOTE — CPM/PAT H&P
"CPM/PAT Evaluation       Name: Rosie Sepulveda (Rosie Sepulveda)  /Age: 1965/58 y.o.     In-Person       Chief Complaint: left shoulder pains    HPI    MM is a 59 yo female who injured her left shoulder in 2024 and imaging shows left shoulder rotator cuff tear.  Subsequently she is scheduled for left shoulder arthroscopy surgery with debridement.  Denies any recent steroid injections.  Skin is intact to surgical limb.  She endorses limited range of motion which causes pain with certain movements. Otherwise denies any recent illness, fever/chills, chest pains or shortness of breath.    Of note, with recent travel she developed left ear \"hot\" in her sensation in which she presented to an urgent care this past Saturday and was prescribed an oral antibiotic for possible ear infection.  She continues to have this odd sensation in the left ear which is relieved with consistent use of nasal spray and head tilting to the left-denies any drainage or pain.  She will be calling the surgeon's office today and sharing this information.    Past Medical History:   Diagnosis Date    Anxiety     Arrhythmia     SVT    DVT (deep venous thrombosis) (CMS/[facility])     GERD (gastroesophageal reflux disease)     Nephrolithiasis     PONV (postoperative nausea and vomiting)     Skin tag      PCP: Dr. Klein  Cards: Dr. Ramires  Vasc: Dr. Tavera (discharged)    Stress test   Normal Lexiscan Myoview cardiac perfusion stress test.  No evidence of ischemia or myocardial infarction by perfusion imaging.  Normal left ventricular systolic function, ejection fraction 74%.  None invasive risk stratification is low risk.    Past Surgical History:   Procedure Laterality Date     SECTION, LOW TRANSVERSE      COLONOSCOPY      DILATION AND CURETTAGE OF UTERUS      KNEE SURGERY      arthroscopic    SHOULDER SURGERY         Patient  reports being sexually active and has had partner(s) who are male. She reports using the " "following method of birth control/protection: Male Sterilization.    Family History   Problem Relation Name Age of Onset    Stroke Mother Helen     Hypertension Mother Helen     Esophageal cancer Father         Allergies   Allergen Reactions    Amoxicillin Itching    Trimethoprim Hives       Prior to Admission medications    Medication Sig Start Date End Date Taking? Authorizing Provider   azithromycin (Zithromax) 250 mg tablet Take 1 tablet (250 mg) by mouth once daily.    Historical Provider, MD   dilTIAZem CD (Cardizem CD) 120 mg 24 hr capsule Take 1 capsule (120 mg) by mouth once daily. 2/15/24 2/14/25  Rolan Ramires MD   docusate sodium (Colace) 100 mg capsule Take 1 capsule (100 mg) by mouth 2 times a day.  Patient not taking: Reported on 4/5/2024 2/15/24 2/14/25  Rolan Ramires MD   fluticasone (Flonase) 50 mcg/actuation nasal spray Administer 1 spray into each nostril once daily. Shake gently. Before first use, prime pump. After use, clean tip and replace cap.    Historical Provider, MD   loratadine (Claritin) 10 mg tablet Take 1 tablet (10 mg) by mouth once daily.    Historical Provider, MD   omeprazole (PriLOSEC) 20 mg DR capsule Take 1 capsule (20 mg) by mouth 2 times a day. 10/19/23 10/18/24  Rolan Ramires MD   sertraline (Zoloft) 100 mg tablet Take 1 tablet (100 mg) by mouth once daily. 5/5/20   Historical Provider, MD TIERRA CURTIS   Constitutional: Negative for fever, chills, or sweats   ENMT: Negative for nasal discharge, congestion, ear pain, mouth pain, throat pain; positive for left ear inner \"hot\" sensation     Respiratory: Negative for cough, wheezing, shortness of breath   Cardiac: Negative for chest pain, dyspnea on exertion, palpitations   Gastrointestinal: Negative for nausea, vomiting, diarrhea, constipation, abdominal pain  Genitourinary: Negative for dysuria, flank pain, frequency, hematuria     Musculoskeletal: Negative swelling, weakness; positive limited ROM to " left shoulder with pain intermittently    Neurological: Negative for dizziness, confusion, headache  Psychiatric: Negative for mood changes   Skin: Negative for itching, rash, ulcer    Hematologic/Lymph: Negative for bruising, easy bleeding  Allergic/Immunologic: Negative itching, sneezing, swelling      Physical Exam  HENT:      Head: Normocephalic.      Right Ear: Tympanic membrane and ear canal normal.      Left Ear: Tympanic membrane and ear canal normal. No swelling. Tympanic membrane is not bulging.      Mouth/Throat:      Mouth: Mucous membranes are moist.   Eyes:      Extraocular Movements: Extraocular movements intact.   Cardiovascular:      Rate and Rhythm: Normal rate and regular rhythm.   Pulmonary:      Effort: Pulmonary effort is normal.      Breath sounds: Normal breath sounds.   Abdominal:      General: Abdomen is flat.      Palpations: Abdomen is soft.   Musculoskeletal:      Left shoulder: Decreased range of motion.      Cervical back: Normal range of motion.   Skin:     General: Skin is warm and dry.   Neurological:      General: No focal deficit present.      Mental Status: She is alert.   Psychiatric:         Mood and Affect: Mood normal.        PAT AIRWAY:   Airway:     Neck ROM::  Full  normal      Anesthesia:  Patient endorses PONV       Visit Vitals  /83   Pulse 73   Temp 36.2 °C (97.2 °F) (Temporal)   Resp 16       DASI Risk Score      Flowsheet Row Most Recent Value   DASI SCORE 58.2   METS Score (Will be calculated only when all the questions are answered) 9.9          Caprini DVT Assessment      Flowsheet Row Most Recent Value   DVT Score 8   Current Status Major surgery planned, including arthroscopic and laproscopic (1-2 hours)   History Prior major surgery, SVT, DVT/PE   Age 40-59 years   BMI 30 or less          Modified Frailty Index      Flowsheet Row Most Recent Value   Modified Frailty Index Calculator 0          CHADS2 Stroke Risk  Current as of 16 minutes ago        N/A 3  "- 100%: High Risk   2 - 3%: Medium Risk   0 - 2%: Low Risk     Last Change: N/A          This score determines the patient's risk of having a stroke if the patient has atrial fibrillation.        This score is not applicable to this patient. Components are not calculated.          Revised Cardiac Risk Index    No data to display       Apfel Simplified Score    No data to display       Risk Analysis Index Results This Encounter         4/5/2024  1450             GARAY Cancer History: Patient does not indicate history of cancer    Total Risk Analysis Index Score Without Cancer: 16    Total Risk Analysis Index Score: 16          Stop Bang Score      Flowsheet Row Most Recent Value   Do you snore loudly? 1   Do you often feel tired or fatigued after your sleep? 0   Has anyone ever observed you stop breathing in your sleep? 0   Do you have or are you being treated for high blood pressure? 0   Recent BMI (Calculated) 27.3   Is BMI greater than 35 kg/m2? 0=No   Age older than 50 years old? 1=Yes   Is your neck circumference greater than 17 inches (Male) or 16 inches (Female)? 0   Gender - Male 0=No   STOP-BANG Total Score 2            Assessment and Plan:     58-year-old female scheduled for left shoulder arthroscopy surgery with debridement tomorrow on 4/9/2024 with Dr. Saunders. BMP ordered. Recent EKG on file from Feb 2024. Otherwise no further orders indicated.     Of note, with recent travel she developed left ear \"hot\" in her sensation in which she presented to an urgent care this past Saturday and was prescribed an oral antibiotic for possible ear infection.  She continues to have this odd sensation in the left ear which is relieved with consistent use of nasal spray and head tilting to the left-denies any drainage or pain.  She will be calling the surgeon's office today and sharing this information.    Anesthesia:  Patient endorses PONV     See risk scores as previously documented.       "

## 2024-04-08 NOTE — H&P (VIEW-ONLY)
"CPM/PAT Evaluation       Name: Rosie Sepulveda (Rosie Sepulveda)  /Age: 1965/58 y.o.     In-Person       Chief Complaint: left shoulder pains    HPI    MM is a 59 yo female who injured her left shoulder in 2024 and imaging shows left shoulder rotator cuff tear.  Subsequently she is scheduled for left shoulder arthroscopy surgery with debridement.  Denies any recent steroid injections.  Skin is intact to surgical limb.  She endorses limited range of motion which causes pain with certain movements. Otherwise denies any recent illness, fever/chills, chest pains or shortness of breath.    Of note, with recent travel she developed left ear \"hot\" in her sensation in which she presented to an urgent care this past Saturday and was prescribed an oral antibiotic for possible ear infection.  She continues to have this odd sensation in the left ear which is relieved with consistent use of nasal spray and head tilting to the left-denies any drainage or pain.  She will be calling the surgeon's office today and sharing this information.    Past Medical History:   Diagnosis Date    Anxiety     Arrhythmia     SVT    DVT (deep venous thrombosis) (CMS/[facility])     GERD (gastroesophageal reflux disease)     Nephrolithiasis     PONV (postoperative nausea and vomiting)     Skin tag      PCP: Dr. Klein  Cards: Dr. Ramires  Vasc: Dr. Tavera (discharged)    Stress test   Normal Lexiscan Myoview cardiac perfusion stress test.  No evidence of ischemia or myocardial infarction by perfusion imaging.  Normal left ventricular systolic function, ejection fraction 74%.  None invasive risk stratification is low risk.    Past Surgical History:   Procedure Laterality Date     SECTION, LOW TRANSVERSE      COLONOSCOPY      DILATION AND CURETTAGE OF UTERUS      KNEE SURGERY      arthroscopic    SHOULDER SURGERY         Patient  reports being sexually active and has had partner(s) who are male. She reports using the " "following method of birth control/protection: Male Sterilization.    Family History   Problem Relation Name Age of Onset    Stroke Mother Helen     Hypertension Mother Helen     Esophageal cancer Father         Allergies   Allergen Reactions    Amoxicillin Itching    Trimethoprim Hives       Prior to Admission medications    Medication Sig Start Date End Date Taking? Authorizing Provider   azithromycin (Zithromax) 250 mg tablet Take 1 tablet (250 mg) by mouth once daily.    Historical Provider, MD   dilTIAZem CD (Cardizem CD) 120 mg 24 hr capsule Take 1 capsule (120 mg) by mouth once daily. 2/15/24 2/14/25  Rolan Ramires MD   docusate sodium (Colace) 100 mg capsule Take 1 capsule (100 mg) by mouth 2 times a day.  Patient not taking: Reported on 4/5/2024 2/15/24 2/14/25  Rolan Ramires MD   fluticasone (Flonase) 50 mcg/actuation nasal spray Administer 1 spray into each nostril once daily. Shake gently. Before first use, prime pump. After use, clean tip and replace cap.    Historical Provider, MD   loratadine (Claritin) 10 mg tablet Take 1 tablet (10 mg) by mouth once daily.    Historical Provider, MD   omeprazole (PriLOSEC) 20 mg DR capsule Take 1 capsule (20 mg) by mouth 2 times a day. 10/19/23 10/18/24  Rolan Ramires MD   sertraline (Zoloft) 100 mg tablet Take 1 tablet (100 mg) by mouth once daily. 5/5/20   Historical Provider, MD TIERRA CURTIS   Constitutional: Negative for fever, chills, or sweats   ENMT: Negative for nasal discharge, congestion, ear pain, mouth pain, throat pain; positive for left ear inner \"hot\" sensation     Respiratory: Negative for cough, wheezing, shortness of breath   Cardiac: Negative for chest pain, dyspnea on exertion, palpitations   Gastrointestinal: Negative for nausea, vomiting, diarrhea, constipation, abdominal pain  Genitourinary: Negative for dysuria, flank pain, frequency, hematuria     Musculoskeletal: Negative swelling, weakness; positive limited ROM to " left shoulder with pain intermittently    Neurological: Negative for dizziness, confusion, headache  Psychiatric: Negative for mood changes   Skin: Negative for itching, rash, ulcer    Hematologic/Lymph: Negative for bruising, easy bleeding  Allergic/Immunologic: Negative itching, sneezing, swelling      Physical Exam  HENT:      Head: Normocephalic.      Right Ear: Tympanic membrane and ear canal normal.      Left Ear: Tympanic membrane and ear canal normal. No swelling. Tympanic membrane is not bulging.      Mouth/Throat:      Mouth: Mucous membranes are moist.   Eyes:      Extraocular Movements: Extraocular movements intact.   Cardiovascular:      Rate and Rhythm: Normal rate and regular rhythm.   Pulmonary:      Effort: Pulmonary effort is normal.      Breath sounds: Normal breath sounds.   Abdominal:      General: Abdomen is flat.      Palpations: Abdomen is soft.   Musculoskeletal:      Left shoulder: Decreased range of motion.      Cervical back: Normal range of motion.   Skin:     General: Skin is warm and dry.   Neurological:      General: No focal deficit present.      Mental Status: She is alert.   Psychiatric:         Mood and Affect: Mood normal.        PAT AIRWAY:   Airway:     Neck ROM::  Full  normal      Anesthesia:  Patient endorses PONV       Visit Vitals  /83   Pulse 73   Temp 36.2 °C (97.2 °F) (Temporal)   Resp 16       DASI Risk Score      Flowsheet Row Most Recent Value   DASI SCORE 58.2   METS Score (Will be calculated only when all the questions are answered) 9.9          Caprini DVT Assessment      Flowsheet Row Most Recent Value   DVT Score 8   Current Status Major surgery planned, including arthroscopic and laproscopic (1-2 hours)   History Prior major surgery, SVT, DVT/PE   Age 40-59 years   BMI 30 or less          Modified Frailty Index      Flowsheet Row Most Recent Value   Modified Frailty Index Calculator 0          CHADS2 Stroke Risk  Current as of 16 minutes ago        N/A 3  "- 100%: High Risk   2 - 3%: Medium Risk   0 - 2%: Low Risk     Last Change: N/A          This score determines the patient's risk of having a stroke if the patient has atrial fibrillation.        This score is not applicable to this patient. Components are not calculated.          Revised Cardiac Risk Index    No data to display       Apfel Simplified Score    No data to display       Risk Analysis Index Results This Encounter         4/5/2024  1450             GARAY Cancer History: Patient does not indicate history of cancer    Total Risk Analysis Index Score Without Cancer: 16    Total Risk Analysis Index Score: 16          Stop Bang Score      Flowsheet Row Most Recent Value   Do you snore loudly? 1   Do you often feel tired or fatigued after your sleep? 0   Has anyone ever observed you stop breathing in your sleep? 0   Do you have or are you being treated for high blood pressure? 0   Recent BMI (Calculated) 27.3   Is BMI greater than 35 kg/m2? 0=No   Age older than 50 years old? 1=Yes   Is your neck circumference greater than 17 inches (Male) or 16 inches (Female)? 0   Gender - Male 0=No   STOP-BANG Total Score 2            Assessment and Plan:     58-year-old female scheduled for left shoulder arthroscopy surgery with debridement tomorrow on 4/9/2024 with Dr. Saunders. BMP ordered. Recent EKG on file from Feb 2024. Otherwise no further orders indicated.     Of note, with recent travel she developed left ear \"hot\" in her sensation in which she presented to an urgent care this past Saturday and was prescribed an oral antibiotic for possible ear infection.  She continues to have this odd sensation in the left ear which is relieved with consistent use of nasal spray and head tilting to the left-denies any drainage or pain.  She will be calling the surgeon's office today and sharing this information.    Anesthesia:  Patient endorses PONV     See risk scores as previously documented.       "

## 2024-04-08 NOTE — PREPROCEDURE INSTRUCTIONS
Medication List            Accurate as of April 8, 2024 10:12 AM. Always use your most recent med list.                azithromycin 250 mg tablet  Commonly known as: Zithromax  Medication Adjustments for Surgery: Take morning of surgery with sip of water, no other fluids     dilTIAZem  mg 24 hr capsule  Commonly known as: Cardizem CD  Take 1 capsule (120 mg) by mouth once daily.  Medication Adjustments for Surgery: Take morning of surgery with sip of water, no other fluids     docusate sodium 100 mg capsule  Commonly known as: Colace  Take 1 capsule (100 mg) by mouth 2 times a day.  Medication Adjustments for Surgery: Other (Comment)  Notes to patient: Not taking     fluticasone 50 mcg/actuation nasal spray  Commonly known as: Flonase  Medication Adjustments for Surgery: Other (Comment)  Notes to patient: May take the morning of surgery if this medication is prescribed to take in the mornings     loratadine 10 mg tablet  Commonly known as: Claritin  Medication Adjustments for Surgery: Take morning of surgery with sip of water, no other fluids     omeprazole 20 mg DR capsule  Commonly known as: PriLOSEC  Take 1 capsule (20 mg) by mouth 2 times a day.  Medication Adjustments for Surgery: Take morning of surgery with sip of water, no other fluids     sertraline 100 mg tablet  Commonly known as: Zoloft  Medication Adjustments for Surgery: Take morning of surgery with sip of water, no other fluids                                  PRE-OPERATIVE INSTRUCTIONS    You will receive notification one business day prior to your procedure to confirm your arrival time. It is important that you answer your phone and/or check your messages during this time. If you do not hear from the surgery center by 5 pm. the day before your procedure, please call [phone].     Please enter the building through the Outpatient entrance and take the elevator off the lobby to the 2nd floor then check in at the Outpatient Surgery desk on  the 2nd floor.    INSTRUCTIONS:  Talk to your surgeon for instructions if you should stop your aspirin, blood thinner, or diabetes medicines.  DO NOT take any multivitamins or over the counter supplements for 7-10 days before surgery.  If not being admitted, you must have an adult immediately available to drive you home after surgery. We also highly recommend you have someone stay with you for the entire day and night of your surgery.  For children having surgery, a parent or legal guardian must accompany them to the surgery center. If this is not possible, please call [phone] to make additional arrangements.  For adults who are unable to consent or make medical decisions for themselves, a legal guardian or Power of  must accompany them to the surgery center. If this is not possible, please call [phone] to make additional arrangements.  Wear comfortable, loose fitting clothing.  All jewelry and piercings must be removed. If you are unable to remove an item or have a dermal piercing, please be sure to tell the nurse when you arrive for surgery.  Nail polish and make-up must be removed.  Avoid smoking or consuming alcohol for 24 hours before surgery.  To help prevent infection, please take a shower/bath and wash your hair the night before and/or morning of surgery (or follow other specific bathing instructions provided).    Preoperative Fasting Guidelines    Why must I stop eating and drinking near surgery time?  With sedation, food or liquid in your stomach can enter your lungs causing serious complications  Increases nausea and vomiting    When do I need to stop eating and drinking before my surgery?  Do not eat any solid food after midnight the night before your surgery/procedure.  You may have up to TEN ounces of clear liquid until TWO hours before your instructed arrival time to the hospital.  This includes water, black tea/coffee, (no milk or cream) apple juice, and electrolyte drinks  (Gatorade).   You may chew gum until TWO hours before your surgery/procedure    If you have any questions or concerns, please call Pre-Admission Testing at [phone].                     Home Preoperative Antibacterial Shower with Chlorhexidine gluconate (CHG)     What is a home preoperative antibacterial shower?  This shower is a way of cleaning the skin with a germ killing solution before surgery. The solution contains chlorhexidine gluconate, commonly known as CHG. CHG is a skin cleanser with germ killing ability. Let your doctor know if you are allergic to chlorhexidine.    Why do I need to take a preoperative antibacterial shower?  Skin is not sterile. It is best to try to make your skin as free of germs as possible before surgery. Proper cleansing with a germ killing soap before surgery can lower the number of germs on your skin. This helps to reduce the risk of infection at the surgical site. Following the instructions listed below will help you prepare your skin for surgery.    How do I use the solution?  Begin using your CHG soap the night before and again the morning of your procedure.   Do not shave the day before or day of surgery.  Remove all jewelry until after surgery. Take off rings and take out all body-piercing jewelry.  Wash your face and hair with normal soap and shampoo before you use the CHG soap.  Apply the CHG solution to a clean wet washcloth. Move away from the water to avoid premature rinsing of the CHG soap as you are applying. Firmly lather your entire body from the neck down. Do not use CHG on your face, eyes, ears, or genitals.   Pay special attention to the area where your incisions will be located.  Do not scrub your skin too hard.  It is important to allow the CHG soap to sit on your skin for 3-5 minutes.  Rinse the solution off your body completely. Do not wash with your normal soap after the CHG soap solution.  Pat yourself dry with a clean, soft towel.  Do not apply powders,  lotions or deodorants as these might block how the CHG soap works.   Dress in clean clothing.  Be sure to sleep with clean, freshly laundered sheets.  Be aware that CHG can cause stains on fabric. Rinse your washcloth and other linens that have contact with CHG completely. Use only non-chlorine detergents to launder the items used.

## 2024-04-09 ENCOUNTER — ANESTHESIA (OUTPATIENT)
Dept: OPERATING ROOM | Facility: HOSPITAL | Age: 59
End: 2024-04-09
Payer: OTHER GOVERNMENT

## 2024-04-09 ENCOUNTER — ANESTHESIA EVENT (OUTPATIENT)
Dept: OPERATING ROOM | Facility: HOSPITAL | Age: 59
End: 2024-04-09
Payer: OTHER GOVERNMENT

## 2024-04-09 ENCOUNTER — HOSPITAL ENCOUNTER (OUTPATIENT)
Facility: HOSPITAL | Age: 59
Setting detail: OUTPATIENT SURGERY
Discharge: HOME | End: 2024-04-09
Attending: ORTHOPAEDIC SURGERY | Admitting: ORTHOPAEDIC SURGERY
Payer: OTHER GOVERNMENT

## 2024-04-09 VITALS
TEMPERATURE: 97.9 F | RESPIRATION RATE: 16 BRPM | OXYGEN SATURATION: 97 % | WEIGHT: 165 LBS | SYSTOLIC BLOOD PRESSURE: 166 MMHG | HEIGHT: 66 IN | HEART RATE: 73 BPM | BODY MASS INDEX: 26.52 KG/M2 | DIASTOLIC BLOOD PRESSURE: 88 MMHG

## 2024-04-09 DIAGNOSIS — S46.012D TRAUMATIC COMPLETE TEAR OF LEFT ROTATOR CUFF, SUBSEQUENT ENCOUNTER: Primary | ICD-10-CM

## 2024-04-09 PROCEDURE — 3600000009 HC OR TIME - EACH INCREMENTAL 1 MINUTE - PROCEDURE LEVEL FOUR: Performed by: ORTHOPAEDIC SURGERY

## 2024-04-09 PROCEDURE — C1713 ANCHOR/SCREW BN/BN,TIS/BN: HCPCS | Performed by: ORTHOPAEDIC SURGERY

## 2024-04-09 PROCEDURE — 3600000004 HC OR TIME - INITIAL BASE CHARGE - PROCEDURE LEVEL FOUR: Performed by: ORTHOPAEDIC SURGERY

## 2024-04-09 PROCEDURE — 7100000001 HC RECOVERY ROOM TIME - INITIAL BASE CHARGE: Performed by: ORTHOPAEDIC SURGERY

## 2024-04-09 PROCEDURE — 7100000002 HC RECOVERY ROOM TIME - EACH INCREMENTAL 1 MINUTE: Performed by: ORTHOPAEDIC SURGERY

## 2024-04-09 PROCEDURE — 3700000002 HC GENERAL ANESTHESIA TIME - EACH INCREMENTAL 1 MINUTE: Performed by: ORTHOPAEDIC SURGERY

## 2024-04-09 PROCEDURE — 64415 NJX AA&/STRD BRCH PLXS IMG: CPT | Performed by: STUDENT IN AN ORGANIZED HEALTH CARE EDUCATION/TRAINING PROGRAM

## 2024-04-09 PROCEDURE — 2500000004 HC RX 250 GENERAL PHARMACY W/ HCPCS (ALT 636 FOR OP/ED): Performed by: ORTHOPAEDIC SURGERY

## 2024-04-09 PROCEDURE — 2500000005 HC RX 250 GENERAL PHARMACY W/O HCPCS: Performed by: ANESTHESIOLOGIST ASSISTANT

## 2024-04-09 PROCEDURE — 2780000003 HC OR 278 NO HCPCS: Performed by: ORTHOPAEDIC SURGERY

## 2024-04-09 PROCEDURE — A29827 PR SHLDR ARTHROSCOP,SURG,W/ROTAT CUFF REPR: Performed by: STUDENT IN AN ORGANIZED HEALTH CARE EDUCATION/TRAINING PROGRAM

## 2024-04-09 PROCEDURE — 7100000010 HC PHASE TWO TIME - EACH INCREMENTAL 1 MINUTE: Performed by: ORTHOPAEDIC SURGERY

## 2024-04-09 PROCEDURE — 2500000004 HC RX 250 GENERAL PHARMACY W/ HCPCS (ALT 636 FOR OP/ED): Mod: JZ | Performed by: ORTHOPAEDIC SURGERY

## 2024-04-09 PROCEDURE — 2720000007 HC OR 272 NO HCPCS: Performed by: ORTHOPAEDIC SURGERY

## 2024-04-09 PROCEDURE — 3700000001 HC GENERAL ANESTHESIA TIME - INITIAL BASE CHARGE: Performed by: ORTHOPAEDIC SURGERY

## 2024-04-09 PROCEDURE — A4217 STERILE WATER/SALINE, 500 ML: HCPCS | Performed by: ORTHOPAEDIC SURGERY

## 2024-04-09 PROCEDURE — 2500000004 HC RX 250 GENERAL PHARMACY W/ HCPCS (ALT 636 FOR OP/ED): Performed by: ANESTHESIOLOGIST ASSISTANT

## 2024-04-09 PROCEDURE — 7100000009 HC PHASE TWO TIME - INITIAL BASE CHARGE: Performed by: ORTHOPAEDIC SURGERY

## 2024-04-09 PROCEDURE — A29827 PR SHLDR ARTHROSCOP,SURG,W/ROTAT CUFF REPR: Performed by: ANESTHESIOLOGIST ASSISTANT

## 2024-04-09 DEVICE — HEALIX ADVANCE BR ANCHOR W/ORTHOCORD TCP/PLGA ABSORBABLE ANCHOR (1) VIOLET (1) BLUE STRAND, SIZE 2 (5 METRIC) ORTHOCORD BRAIDED COMPOSITE SUTURE, 36 INCHES (91CM) 4.5MM
Type: IMPLANTABLE DEVICE | Site: SHOULDER | Status: FUNCTIONAL
Brand: HEALIX ADVANCE ORTHOCORD

## 2024-04-09 DEVICE — HEALIX ADVANCE KNOTLESS BR ANCHOR TCP/PLGA ABSORBABLE ANCHOR 5.5MM
Type: IMPLANTABLE DEVICE | Site: SHOULDER | Status: FUNCTIONAL
Brand: HEALIX ADVANCE

## 2024-04-09 RX ORDER — PROPOFOL 10 MG/ML
INJECTION, EMULSION INTRAVENOUS AS NEEDED
Status: DISCONTINUED | OUTPATIENT
Start: 2024-04-09 | End: 2024-04-09

## 2024-04-09 RX ORDER — CELECOXIB 200 MG/1
200 CAPSULE ORAL 2 TIMES DAILY
Qty: 6 CAPSULE | Refills: 0 | Status: SHIPPED | OUTPATIENT
Start: 2024-04-09

## 2024-04-09 RX ORDER — FENTANYL CITRATE 50 UG/ML
50 INJECTION, SOLUTION INTRAMUSCULAR; INTRAVENOUS EVERY 5 MIN PRN
Status: DISCONTINUED | OUTPATIENT
Start: 2024-04-09 | End: 2024-04-09 | Stop reason: HOSPADM

## 2024-04-09 RX ORDER — DEXAMETHASONE SODIUM PHOSPHATE 10 MG/ML
INJECTION INTRAMUSCULAR; INTRAVENOUS AS NEEDED
Status: DISCONTINUED | OUTPATIENT
Start: 2024-04-09 | End: 2024-04-09

## 2024-04-09 RX ORDER — ALBUTEROL SULFATE 0.83 MG/ML
2.5 SOLUTION RESPIRATORY (INHALATION) ONCE AS NEEDED
Status: DISCONTINUED | OUTPATIENT
Start: 2024-04-09 | End: 2024-04-09 | Stop reason: HOSPADM

## 2024-04-09 RX ORDER — MIDAZOLAM HYDROCHLORIDE 1 MG/ML
INJECTION, SOLUTION INTRAMUSCULAR; INTRAVENOUS AS NEEDED
Status: DISCONTINUED | OUTPATIENT
Start: 2024-04-09 | End: 2024-04-09

## 2024-04-09 RX ORDER — CEFAZOLIN SODIUM 2 G/100ML
2 INJECTION, SOLUTION INTRAVENOUS ONCE
Status: COMPLETED | OUTPATIENT
Start: 2024-04-09 | End: 2024-04-09

## 2024-04-09 RX ORDER — SODIUM CHLORIDE, SODIUM LACTATE, POTASSIUM CHLORIDE, CALCIUM CHLORIDE 600; 310; 30; 20 MG/100ML; MG/100ML; MG/100ML; MG/100ML
100 INJECTION, SOLUTION INTRAVENOUS CONTINUOUS
Status: DISCONTINUED | OUTPATIENT
Start: 2024-04-09 | End: 2024-04-09 | Stop reason: HOSPADM

## 2024-04-09 RX ORDER — FENTANYL CITRATE 50 UG/ML
INJECTION, SOLUTION INTRAMUSCULAR; INTRAVENOUS AS NEEDED
Status: DISCONTINUED | OUTPATIENT
Start: 2024-04-09 | End: 2024-04-09

## 2024-04-09 RX ORDER — OXYCODONE HYDROCHLORIDE 5 MG/1
5 TABLET ORAL EVERY 4 HOURS PRN
Status: DISCONTINUED | OUTPATIENT
Start: 2024-04-09 | End: 2024-04-09 | Stop reason: HOSPADM

## 2024-04-09 RX ORDER — LIDOCAINE HYDROCHLORIDE 20 MG/ML
INJECTION, SOLUTION INFILTRATION; PERINEURAL AS NEEDED
Status: DISCONTINUED | OUTPATIENT
Start: 2024-04-09 | End: 2024-04-09

## 2024-04-09 RX ORDER — PHENYLEPHRINE 10 MG/250 ML(40 MCG/ML)IN 0.9 % SOD.CHLORIDE INTRAVENOUS
CONTINUOUS PRN
Status: DISCONTINUED | OUTPATIENT
Start: 2024-04-09 | End: 2024-04-09

## 2024-04-09 RX ORDER — LIDOCAINE HYDROCHLORIDE 10 MG/ML
0.1 INJECTION, SOLUTION EPIDURAL; INFILTRATION; INTRACAUDAL; PERINEURAL ONCE
Status: DISCONTINUED | OUTPATIENT
Start: 2024-04-09 | End: 2024-04-09 | Stop reason: HOSPADM

## 2024-04-09 RX ORDER — ROCURONIUM BROMIDE 10 MG/ML
INJECTION, SOLUTION INTRAVENOUS AS NEEDED
Status: DISCONTINUED | OUTPATIENT
Start: 2024-04-09 | End: 2024-04-09

## 2024-04-09 RX ORDER — OXYCODONE AND ACETAMINOPHEN 5; 325 MG/1; MG/1
1 TABLET ORAL EVERY 6 HOURS PRN
Qty: 28 TABLET | Refills: 0 | Status: SHIPPED | OUTPATIENT
Start: 2024-04-09

## 2024-04-09 RX ORDER — CELECOXIB 200 MG/1
200 CAPSULE ORAL ONCE
Status: DISCONTINUED | OUTPATIENT
Start: 2024-04-09 | End: 2024-04-09 | Stop reason: HOSPADM

## 2024-04-09 RX ORDER — HYDRALAZINE HYDROCHLORIDE 20 MG/ML
5 INJECTION INTRAMUSCULAR; INTRAVENOUS EVERY 30 MIN PRN
Status: DISCONTINUED | OUTPATIENT
Start: 2024-04-09 | End: 2024-04-09 | Stop reason: HOSPADM

## 2024-04-09 RX ORDER — PHENYLEPHRINE HYDROCHLORIDE 10 MG/ML
INJECTION INTRAVENOUS AS NEEDED
Status: DISCONTINUED | OUTPATIENT
Start: 2024-04-09 | End: 2024-04-09

## 2024-04-09 RX ORDER — ONDANSETRON HYDROCHLORIDE 2 MG/ML
4 INJECTION, SOLUTION INTRAVENOUS ONCE AS NEEDED
Status: DISCONTINUED | OUTPATIENT
Start: 2024-04-09 | End: 2024-04-09 | Stop reason: HOSPADM

## 2024-04-09 RX ORDER — GLYCOPYRROLATE 0.2 MG/ML
INJECTION INTRAMUSCULAR; INTRAVENOUS AS NEEDED
Status: DISCONTINUED | OUTPATIENT
Start: 2024-04-09 | End: 2024-04-09

## 2024-04-09 RX ORDER — LABETALOL HYDROCHLORIDE 5 MG/ML
5 INJECTION, SOLUTION INTRAVENOUS ONCE AS NEEDED
Status: DISCONTINUED | OUTPATIENT
Start: 2024-04-09 | End: 2024-04-09 | Stop reason: HOSPADM

## 2024-04-09 RX ADMIN — SUGAMMADEX 200 MG: 100 INJECTION, SOLUTION INTRAVENOUS at 12:58

## 2024-04-09 RX ADMIN — FENTANYL CITRATE 25 MCG: 50 INJECTION, SOLUTION INTRAMUSCULAR; INTRAVENOUS at 13:03

## 2024-04-09 RX ADMIN — PROPOFOL 200 MG: 10 INJECTION, EMULSION INTRAVENOUS at 11:48

## 2024-04-09 RX ADMIN — SODIUM CHLORIDE, SODIUM LACTATE, POTASSIUM CHLORIDE, AND CALCIUM CHLORIDE: 600; 310; 30; 20 INJECTION, SOLUTION INTRAVENOUS at 10:20

## 2024-04-09 RX ADMIN — ROCURONIUM BROMIDE 50 MG: 10 INJECTION INTRAVENOUS at 11:48

## 2024-04-09 RX ADMIN — DEXAMETHASONE SODIUM PHOSPHATE 10 MG: 10 INJECTION, SOLUTION INTRAMUSCULAR; INTRAVENOUS at 12:10

## 2024-04-09 RX ADMIN — GLYCOPYRROLATE 0.2 MG: 0.2 INJECTION, SOLUTION INTRAMUSCULAR; INTRAVENOUS at 12:24

## 2024-04-09 RX ADMIN — Medication 10 MG: at 12:12

## 2024-04-09 RX ADMIN — MIDAZOLAM 2 MG: 1 INJECTION INTRAMUSCULAR; INTRAVENOUS at 11:30

## 2024-04-09 RX ADMIN — CEFAZOLIN SODIUM 2 G: 2 INJECTION, SOLUTION INTRAVENOUS at 11:55

## 2024-04-09 RX ADMIN — FENTANYL CITRATE 25 MCG: 50 INJECTION, SOLUTION INTRAMUSCULAR; INTRAVENOUS at 12:47

## 2024-04-09 RX ADMIN — Medication 0.6 MCG/KG/MIN: at 12:24

## 2024-04-09 RX ADMIN — LIDOCAINE HYDROCHLORIDE 100 MG: 20 INJECTION, SOLUTION INFILTRATION; PERINEURAL at 11:48

## 2024-04-09 RX ADMIN — FENTANYL CITRATE 50 MCG: 50 INJECTION, SOLUTION INTRAMUSCULAR; INTRAVENOUS at 11:43

## 2024-04-09 RX ADMIN — PHENYLEPHRINE HYDROCHLORIDE 150 MCG: 10 INJECTION INTRAVENOUS at 12:23

## 2024-04-09 ASSESSMENT — PAIN - FUNCTIONAL ASSESSMENT
PAIN_FUNCTIONAL_ASSESSMENT: 0-10
PAIN_FUNCTIONAL_ASSESSMENT: WONG-BAKER FACES
PAIN_FUNCTIONAL_ASSESSMENT: 0-10
PAIN_FUNCTIONAL_ASSESSMENT: 0-10

## 2024-04-09 ASSESSMENT — COLUMBIA-SUICIDE SEVERITY RATING SCALE - C-SSRS
6. HAVE YOU EVER DONE ANYTHING, STARTED TO DO ANYTHING, OR PREPARED TO DO ANYTHING TO END YOUR LIFE?: NO
1. IN THE PAST MONTH, HAVE YOU WISHED YOU WERE DEAD OR WISHED YOU COULD GO TO SLEEP AND NOT WAKE UP?: NO
2. HAVE YOU ACTUALLY HAD ANY THOUGHTS OF KILLING YOURSELF?: NO

## 2024-04-09 ASSESSMENT — PAIN SCALES - GENERAL
PAINLEVEL_OUTOF10: 1
PAINLEVEL_OUTOF10: 0 - NO PAIN
PAINLEVEL_OUTOF10: 2
PAINLEVEL_OUTOF10: 0 - NO PAIN
PAINLEVEL_OUTOF10: 2
PAINLEVEL_OUTOF10: 0 - NO PAIN
PAINLEVEL_OUTOF10: 2
PAINLEVEL_OUTOF10: 0 - NO PAIN

## 2024-04-09 ASSESSMENT — PAIN DESCRIPTION - DESCRIPTORS
DESCRIPTORS: BURNING
DESCRIPTORS: BURNING

## 2024-04-09 NOTE — ANESTHESIA POSTPROCEDURE EVALUATION
Patient: Rosie Sepulveda    Procedure Summary       Date: 04/09/24 Room / Location: [facility] OR 09 / Virtual [facility] OR    Anesthesia Start: 1144 Anesthesia Stop: 1307    Procedure: LEFT ARTHROSCOPIC SHOULDER SX; DEBRIDEMENT; SUBACROMIAL DECOMPRESSION/ACROMIOPLASTY; ARTHROSCOPIC ROTATOR CUFF REPAIR (Left: Shoulder) Diagnosis: (S46.012A - Rotator cuff tear, left shoulder)    Surgeons: Moncho Saunders MD Responsible Provider: Fatoumata Maxwell MD    Anesthesia Type: general, regional ASA Status: Not recorded            Anesthesia Type: general, regional    Vitals Value Taken Time   /86 04/09/24 1305   Temp 36 04/09/24 1307   Pulse 95 04/09/24 1307   Resp 14 04/09/24 1307   SpO2 99 % 04/09/24 1306   Vitals shown include unvalidated device data.    Anesthesia Post Evaluation    Patient location during evaluation: PACU  Patient participation: waiting for patient participation  Level of consciousness: sleepy but conscious  Pain management: adequate  Airway patency: patent  Cardiovascular status: acceptable  Respiratory status: acceptable  Hydration status: acceptable  Postoperative Nausea and Vomiting: none        No notable events documented.

## 2024-04-09 NOTE — ANESTHESIA PROCEDURE NOTES
Peripheral Block    Patient location during procedure: pre-op  Start time: 4/9/2024 10:35 AM  End time: 4/9/2024 10:37 AM  Reason for block: at surgeon's request  Staffing  Performed: attending   Authorized by: Fatoumata Maxwell MD    Performed by: Fatoumata Maxwell MD  Preanesthetic Checklist  Completed: patient identified, IV checked, site marked, risks and benefits discussed, surgical consent, monitors and equipment checked, pre-op evaluation and timeout performed   Timeout performed at: 4/9/2024 10:35 AM  Peripheral Block  Patient position: laying flat  Prep: ChloraPrep  Patient monitoring: heart rate and continuous pulse ox  Block type: interscalene  Laterality: left  Injection technique: single-shot  Guidance: ultrasound guided  Local infiltration: lidocaine  Infiltration strength: 2 %  Needle  Needle type: Tuohy   Needle gauge: 26 G  Needle length: 5 cm  Needle localization: ultrasound guidance     image stored in chart  Assessment  Injection assessment: negative aspiration for heme, no paresthesia on injection, incremental injection and local visualized surrounding nerve on ultrasound  Additional Notes  Interscalene brachial plexus block: Informed consent obtained.  Risks and benefits discussed.  ASA monitors placed, timeout performed.  Patient position, prepped with chlorhexidine, and draped with sterile towels.  Ultrasound guidance used to visualize the brachial plexus and surrounding structures with visualization of the needle throughout duration of the procedure.  Aspiration was negative.  20 cc 0.5% ropivacaine injected.  Patient tolerated procedure well.      Timeout by Arelis/Elizabeth.

## 2024-04-09 NOTE — INTERVAL H&P NOTE
H&P reviewed. The patient was examined and there are no changes to the H&P.   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  She is scheduled to see Dr. Keith 7/9/20 at [facility].    -pls consult RT onc for pall RT if she is going to remain inpatient this week.   -pall care consult asap for pain management.  she is currently on fentanyl 25 mcg (which I was told by ED she removed because did not think it helped her) and also on morphine IR 15 mg q4-6 hrs prn.  She will need dose optimization as she reports no pain relief with this regimen.

## 2024-04-09 NOTE — OP NOTE
LEFT ARTHROSCOPIC SHOULDER SX; DEBRIDEMENT; SUBACROMIAL DECOMPRESSION/ACROMIOPLASTY; ARTHROSCOPIC ROTATOR CUFF REPAIR (L) Operative Note     Date: 2024  OR Location: [facility] OR    Name: Rosie Sepulveda, : 1965, Age: 58 y.o., MRN: [id number], Sex: female    Diagnosis  * No Diagnosis Codes entered * * No Diagnosis Codes entered *     Procedures  With arthroscopic shoulder surgery, debridement, subacromial decompression/acromioplasty, arthroscopic rotator cuff repair.    Surgeons      * Moncho Saunders - Primary    Resident/Fellow/Other Assistant:  Surgeon(s) and Role:    Procedure Summary  Anesthesia: General  ASA: ASA status not filed in the log.  Anesthesia Staff: Anesthesiologist: Fatoumata Maxwell MD  C-AA: OLAMIDE Baptiste  Estimated Blood Loss: minimal  Intra-op Medications:   Administrations occurring from 1105 to 1330 on 24:   Medication Name Total Dose   ceFAZolin in dextrose (iso-os) (Ancef) IVPB 2 g 2 g              Anesthesia Record               Intraprocedure I/O Totals          Intake    Phenylephrine Drip 0.00 mL    The total shown is the total volume documented since Anesthesia Start was filed.    Total Intake 0 mL          Specimen: No specimens collected     Staff:   Circulator: Martin Chowdhury RN; Fanny Dumont RN; Raven Ziegler RN; Yakov Sumner Jr., RN  Scrub Person: Yanira Martines; Len Martines         Drains and/or Catheters: * None in log *    Tourniquet Times:         Implants:     Findings: Left superior labral tear, partial biceps tear, chondral lesion glenoid, full-thickness rotator cuff tear.    Indications: Rosie Sepulveda is an 58 y.o. female who is having surgery for S46.012A - Rotator cuff tear, left shoulder.     The patient was seen in the preoperative area. The risks, benefits, complications, treatment options, non-operative alternatives, expected recovery and outcomes were discussed with the patient. The possibilities of reaction to medication, pulmonary  aspiration, injury to surrounding structures, bleeding, recurrent infection, the need for additional procedures, failure to diagnose a condition, and creating a complication requiring transfusion or operation were discussed with the patient. The patient concurred with the proposed plan, giving informed consent.  The site of surgery was properly noted/marked if necessary per policy. The patient has been actively warmed in preoperative area. Preoperative antibiotics have been ordered and given within 1 hours of incision. Venous thrombosis prophylaxis are not indicated.    Procedure Details: Left arthroscopic shoulder surgery, debridement, subacromial decompression/acromioplasty, arthroscopic rotator cuff repair.      Complications:  None; patient tolerated the procedure well.    Disposition: PACU - hemodynamically stable.  Condition: stable     Patient is a 58-year of age right-hand-dominant female who has had persistent left shoulder pain over the last 2 months after patient fell while in [place] injuring the left shoulder.  Symptoms did not improve with nonoperative treatment methods.  These include activity modification, physical therapy and anti-inflammatories.  Patient had MRI consistent with a full-thickness rotator cuff tear, evidence of long head biceps tear.  Symptoms are affecting activities of daily living.  Risks and benefits of nonoperative and operative treatment reviewed.  Patient wanted to proceed with operative approach.  Patient was informed of risks to include but not limited to persistent pain, recurrent tear, infection, wound complications, arthrofibrosis, DVT, pulmonary embolus, neurovascular injury, complex regional pain syndrome, medical anesthetic risk.  In addition patient was informed of risk of exposure to COVID-19 and the potential medical consequences.  All questions were answered and consent was obtained.    Patient brought to the operative room where after induction of general and  regional anesthesia was placed in a beachchair position in a shoulder arthroscopy chair.  Exam under anesthesia demonstrated range of motion of 180/60/70.  There is no evidence of instability on exam.  Left upper extremity was prepped and draped in usual sterile fashion.  Patient was given prophylactic antibiotics.  Timeout was taken confirming surgical site, procedure, instrumentation, fire risk, and antibiotics.60 cc of sterile saline were introduced into the glenohumeral joint via posterior approach.  15 blade was used to make a posterior skin incision.      The arthroscope inserted bluntly into the glenohumeral joint.  Under direct vision anterior portal was established.  Arthroscopic evaluation demonstrated .  There was evidence of type II superior labral tear.  There is evidence of partial biceps tear.  Subscapularis was intact.  There is evidence of posterior superior chondral lesion with loose chondral tissue.  There is evidence of a full-thickness rotator cuff tear.  Via the anterior portal extensive debridement was performed of the glenohumeral joint of the superior labrum, partial biceps tear and loose chondral tissue.  The arthroscope was then placed in the subacromial space.  Lateral portal was established with an Arthrex passport cannula.  Percutaneous anterior lateral and posterior lateral portals were established.  Bursectomy was performed.  Acromioplasty was performed with arthroscopic bur.  There was a full-thickness crescent tear at rotator cuff tear.  Tendon was mobilized.  Traction sutures were applied.  Debridement was performed of the rotator cuff insertion site.  A cancellus bleeding bed was created.  A 4.5 Mytec Healix anchor was placed in the rotator cuff insertion.  All 4 suture limbs were placed with a espresso suture shuttle in mattress suture fashion.  Sutures were then tensioned down with sliding locking knot half inches.  Next a 5.5 knotless Healix anchor was placed laterally.  All 4  suture limbs were brought through and tension down.  Excellent repair of the rotator cuff was obtained.  Final arthroscopic images were taken.  The shoulder was then copiously irrigated through inflow and outflow cannulas.  Arthroscopic instruments were removed.  Portals closed with 4-0 Monocryl subcuticular suture.  Bacitracin Adaptic sterile dressings were applied.  Patient was placed in a shoulder immobilizer with an abduction pillow.  Cryo/Cuff was applied.  All sponge and the counts were correct at the end the case.          Attending Attestation:     Moncho Saunders  Phone Number: [phone]

## 2024-04-09 NOTE — ANESTHESIA PREPROCEDURE EVALUATION
Patient: Rosie Sepulveda    Procedure Information       Date/Time: 04/09/24 5885    Procedure: LEFT ARTHROSCOPIC SHOULDER SX; DEBRIDEMENT; SUBACROMIAL DECOMPRESSION/ACROMIOPLASTY; ARTHROSCOPIC ROTATOR CUFF REPAIR (Left: Shoulder)    Location: [facility] OR 09 / [facility] OR    Surgeons: Moncho Saunders MD            Relevant Problems   Anesthesia (within normal limits)      Cardiac   (+) Chest pain   (+) Essential hypertension   (+) SVT (supraventricular tachycardia) (CMS/HCC)      Pulmonary   (+) GOMEZ (dyspnea on exertion)      Neuro   (+) Anxiety   (+) Bilateral carpal tunnel syndrome   (+) Depression with anxiety      GI   (+) Gastroesophageal reflux disease without esophagitis      /Renal   (+) Nephrolithiasis      Hematology   (+) Deep vein thrombosis (DVT) of calf muscle vein of left lower extremity (CMS/HCC)   (+) Long term current use of anticoagulant therapy      Musculoskeletal   (+) Bilateral carpal tunnel syndrome   (+) Osteoarthritis      HEENT   (+) Sensorineural hearing loss, bilateral      GYN   (+) Excessive bleeding in premenopausal period      Circulatory   (+) Right ventricular enlargement       Clinical information reviewed:                   NPO Detail:  No data recorded     PHYSICAL EXAM    Anesthesia Plan    History of general anesthesia?: yes  History of complications of general anesthesia?: no    ASA 3     general and regional     intravenous induction   Postoperative administration of opioids is intended.  Anesthetic plan and risks discussed with patient.  Use of blood products discussed with patient who consented to blood products.    Plan discussed with CRNA, CAA and attending.

## 2024-04-09 NOTE — DISCHARGE INSTRUCTIONS
Postoperative rotator cuff repair instructions:    1.  Leave dressing clean and dry for 3 days.  After 3 days you may remove the dressing.  Leave the Steri-Strips intact.  You can cover the Steri-Strips with Band-Aids.  2.  Once the dressing is removed you may shower.  Do not submerge the shoulder into a hot tub, pool etc.  3.  Perform hand, elbow, wrist range of motion and pendulum exercises beginning on postoperative day #1 or 2.  Perform the exercises 3 times a day.  4.  Use the sling and abduction pillow at all times except when doing exercises and when showering.  This would include wearing the sling and abduction pillow during sleep.  5.  Do not do any lifting, resistance push or pull or overhead activity with the operative extremity.  6.  Follow-up should be scheduled approximately 2 weeks after the operative date.  7.  If questions arise please contact the office at [phone].

## 2024-11-07 ENCOUNTER — OFFICE VISIT (OUTPATIENT)
Dept: URGENT CARE | Age: 59
End: 2024-11-07
Payer: OTHER GOVERNMENT

## 2024-11-07 VITALS
HEART RATE: 74 BPM | TEMPERATURE: 97.9 F | OXYGEN SATURATION: 98 % | DIASTOLIC BLOOD PRESSURE: 82 MMHG | WEIGHT: 165 LBS | RESPIRATION RATE: 16 BRPM | BODY MASS INDEX: 26.52 KG/M2 | SYSTOLIC BLOOD PRESSURE: 133 MMHG | HEIGHT: 66 IN

## 2024-11-07 DIAGNOSIS — J06.9 UPPER RESPIRATORY TRACT INFECTION, UNSPECIFIED TYPE: ICD-10-CM

## 2024-11-07 DIAGNOSIS — J02.9 SORE THROAT: Primary | ICD-10-CM

## 2024-11-07 LAB — POC RAPID STREP: NEGATIVE

## 2024-11-07 PROCEDURE — 99203 OFFICE O/P NEW LOW 30 MIN: CPT | Performed by: FAMILY MEDICINE

## 2024-11-07 PROCEDURE — 3079F DIAST BP 80-89 MM HG: CPT | Performed by: FAMILY MEDICINE

## 2024-11-07 PROCEDURE — 3008F BODY MASS INDEX DOCD: CPT | Performed by: FAMILY MEDICINE

## 2024-11-07 PROCEDURE — 87880 STREP A ASSAY W/OPTIC: CPT | Performed by: FAMILY MEDICINE

## 2024-11-07 PROCEDURE — 3075F SYST BP GE 130 - 139MM HG: CPT | Performed by: FAMILY MEDICINE

## 2024-11-07 PROCEDURE — 1036F TOBACCO NON-USER: CPT | Performed by: FAMILY MEDICINE

## 2024-11-07 RX ORDER — DOXYCYCLINE 100 MG/1
100 CAPSULE ORAL 2 TIMES DAILY
Qty: 20 CAPSULE | Refills: 0 | Status: SHIPPED | OUTPATIENT
Start: 2024-11-07 | End: 2024-11-17

## 2024-11-07 ASSESSMENT — ENCOUNTER SYMPTOMS
SORE THROAT: 1
CHILLS: 0
SHORTNESS OF BREATH: 0
SINUS PAIN: 1
HEADACHES: 1
SINUS PRESSURE: 1
EYE PAIN: 0
RHINORRHEA: 1
EYE REDNESS: 0
CHEST TIGHTNESS: 0
FEVER: 0
COUGH: 1
EYE DISCHARGE: 0
WHEEZING: 0

## 2024-11-07 ASSESSMENT — PATIENT HEALTH QUESTIONNAIRE - PHQ9
SUM OF ALL RESPONSES TO PHQ9 QUESTIONS 1 AND 2: 0
1. LITTLE INTEREST OR PLEASURE IN DOING THINGS: NOT AT ALL
2. FEELING DOWN, DEPRESSED OR HOPELESS: NOT AT ALL

## 2024-11-07 NOTE — PROGRESS NOTES
Subjective   Patient ID: Rosie Sepulveda is a 58 y.o. female. They present today with a chief complaint of Sore Throat, Headache, and Nasal Congestion (X2and a half weeks ).    History of Present Illness  Patient reports that her symptoms cleared while on steroids, but returned after she completed them.      History provided by:  Patient   used: No    Sore Throat   This is a new problem. The current episode started 1 to 4 weeks ago. The problem has been unchanged. There has been no fever. The pain is at a severity of 7/10. The pain is moderate. Associated symptoms include congestion, coughing and headaches. Pertinent negatives include no ear pain or shortness of breath. She has had no exposure to strep. Treatments tried: Prednisone. The treatment provided significant relief.   Headache  Associated symptoms: congestion, cough, drainage, sinus pressure and sore throat    Associated symptoms: no ear pain, no eye pain and no fever        Past Medical History  Allergies as of 2024 - Reviewed 2024   Allergen Reaction Noted    Amoxicillin Itching 10/06/2023    Trimethoprim Hives 10/06/2023       (Not in a hospital admission)       Past Medical History:   Diagnosis Date    Anxiety     Arrhythmia     SVT    DVT (deep venous thrombosis) (Multi)     GERD (gastroesophageal reflux disease)     Nephrolithiasis     PONV (postoperative nausea and vomiting)     Skin tag        Past Surgical History:   Procedure Laterality Date     SECTION, LOW TRANSVERSE      COLONOSCOPY      DILATION AND CURETTAGE OF UTERUS      KNEE SURGERY      arthroscopic    SHOULDER SURGERY          reports that she has never smoked. She has never used smokeless tobacco. She reports current alcohol use of about 5.0 standard drinks of alcohol per week. She reports that she does not use drugs.    Review of Systems  Review of Systems   Constitutional:  Negative for chills and fever.   HENT:  Positive for congestion,  "postnasal drip, rhinorrhea, sinus pressure, sinus pain and sore throat. Negative for ear pain.    Eyes:  Negative for pain, discharge and redness.   Respiratory:  Positive for cough. Negative for chest tightness, shortness of breath and wheezing.    Cardiovascular:  Negative for chest pain.   Neurological:  Positive for headaches.                                  Objective    Vitals:    11/07/24 1244   BP: 133/82   Pulse: 74   Resp: 16   Temp: 36.6 °C (97.9 °F)   TempSrc: Oral   SpO2: 98%   Weight: 74.8 kg (165 lb)   Height: 1.676 m (5' 6\")     No LMP recorded. Patient is postmenopausal.    Physical Exam  Vitals reviewed.   Constitutional:       General: She is not in acute distress.     Appearance: She is not ill-appearing.   HENT:      Right Ear: Tympanic membrane and ear canal normal.      Left Ear: Tympanic membrane and ear canal normal.      Nose: Nose normal.      Mouth/Throat:      Mouth: Mucous membranes are moist.      Pharynx: Postnasal drip present. No posterior oropharyngeal erythema.   Eyes:      Extraocular Movements: Extraocular movements intact.      Conjunctiva/sclera: Conjunctivae normal.      Pupils: Pupils are equal, round, and reactive to light.   Cardiovascular:      Rate and Rhythm: Normal rate and regular rhythm.      Heart sounds: No murmur heard.     No friction rub.   Pulmonary:      Effort: Pulmonary effort is normal. No respiratory distress.      Breath sounds: No wheezing, rhonchi or rales.   Lymphadenopathy:      Cervical: No cervical adenopathy.   Neurological:      Mental Status: She is alert.         Procedures    Point of Care Test & Imaging Results from this visit  Results for orders placed or performed in visit on 11/07/24   POCT rapid strep A manually resulted   Result Value Ref Range    POC Rapid Strep Negative Negative      No results found.    Diagnostic study results (if any) were reviewed by Kamlesh Molina DO.    Assessment/Plan   Allergies, medications, history, and " pertinent labs/EKGs/Imaging reviewed by Kamlesh Molina DO.     Orders and Diagnoses  Diagnoses and all orders for this visit:  Sore throat  -     POCT rapid strep A manually resulted      Medical Admin Record      Patient disposition: Home    Electronically signed by Kamlesh Molina DO  12:58 PM

## 2024-12-10 DIAGNOSIS — M19.90 OSTEOARTHRITIS, UNSPECIFIED OSTEOARTHRITIS TYPE, UNSPECIFIED SITE: ICD-10-CM

## 2024-12-10 DIAGNOSIS — K21.9 GASTROESOPHAGEAL REFLUX DISEASE WITHOUT ESOPHAGITIS: ICD-10-CM

## 2024-12-10 DIAGNOSIS — M24.20 EAGLE'S SYNDROME: ICD-10-CM

## 2024-12-10 DIAGNOSIS — I10 ESSENTIAL HYPERTENSION: ICD-10-CM

## 2024-12-10 DIAGNOSIS — Z86.16 HISTORY OF COVID-19: ICD-10-CM

## 2024-12-10 DIAGNOSIS — I82.462 DEEP VEIN THROMBOSIS (DVT) OF CALF MUSCLE VEIN OF LEFT LOWER EXTREMITY, UNSPECIFIED CHRONICITY (MULTI): ICD-10-CM

## 2024-12-10 DIAGNOSIS — I51.7 RIGHT VENTRICULAR ENLARGEMENT: ICD-10-CM

## 2024-12-10 DIAGNOSIS — R00.2 PALPITATIONS: ICD-10-CM

## 2024-12-10 DIAGNOSIS — I47.10 SVT (SUPRAVENTRICULAR TACHYCARDIA) (CMS-HCC): ICD-10-CM

## 2024-12-10 DIAGNOSIS — F41.9 ANXIETY: ICD-10-CM

## 2024-12-10 DIAGNOSIS — G56.03 BILATERAL CARPAL TUNNEL SYNDROME: ICD-10-CM

## 2024-12-10 DIAGNOSIS — R06.09 DOE (DYSPNEA ON EXERTION): ICD-10-CM

## 2024-12-10 DIAGNOSIS — R07.9 CHEST PAIN, UNSPECIFIED TYPE: ICD-10-CM

## 2024-12-10 DIAGNOSIS — K59.00 CONSTIPATION, UNSPECIFIED CONSTIPATION TYPE: ICD-10-CM

## 2024-12-10 DIAGNOSIS — Z79.01 LONG TERM CURRENT USE OF ANTICOAGULANT THERAPY: ICD-10-CM

## 2024-12-10 RX ORDER — DILTIAZEM HYDROCHLORIDE 120 MG/1
120 CAPSULE, COATED, EXTENDED RELEASE ORAL DAILY
Qty: 30 CAPSULE | Refills: 11 | Status: SHIPPED | OUTPATIENT
Start: 2024-12-10 | End: 2024-12-10

## 2024-12-10 NOTE — TELEPHONE ENCOUNTER
Pt called stated Dr Ramires had to send medication order over to pharmacy due to they did not get an the order to refill medication when she was in office.

## 2025-02-04 ENCOUNTER — OFFICE VISIT (OUTPATIENT)
Dept: URGENT CARE | Age: 60
End: 2025-02-04
Payer: OTHER GOVERNMENT

## 2025-02-04 VITALS
DIASTOLIC BLOOD PRESSURE: 83 MMHG | OXYGEN SATURATION: 98 % | HEIGHT: 66 IN | TEMPERATURE: 98.1 F | BODY MASS INDEX: 26.52 KG/M2 | WEIGHT: 165 LBS | HEART RATE: 69 BPM | SYSTOLIC BLOOD PRESSURE: 128 MMHG | RESPIRATION RATE: 18 BRPM

## 2025-02-04 DIAGNOSIS — H10.33 ACUTE BACTERIAL CONJUNCTIVITIS OF BOTH EYES: ICD-10-CM

## 2025-02-04 DIAGNOSIS — J01.10 ACUTE NON-RECURRENT FRONTAL SINUSITIS: Primary | ICD-10-CM

## 2025-02-04 PROBLEM — R51.9 HEADACHE: Status: ACTIVE | Noted: 2025-02-04

## 2025-02-04 PROBLEM — S46.002S ROTATOR CUFF INJURY, LEFT, SEQUELA: Status: ACTIVE | Noted: 2024-03-28

## 2025-02-04 PROBLEM — Z86.718 HISTORY OF DVT (DEEP VEIN THROMBOSIS): Status: ACTIVE | Noted: 2024-10-28

## 2025-02-04 PROBLEM — S46.012A TRAUMATIC COMPLETE TEAR OF LEFT ROTATOR CUFF: Status: ACTIVE | Noted: 2024-03-28

## 2025-02-04 PROBLEM — R94.30 ABNORMAL CARDIAC FUNCTION TEST: Status: ACTIVE | Noted: 2025-02-04

## 2025-02-04 PROBLEM — L81.4 LENTIGINOSIS: Status: ACTIVE | Noted: 2025-02-04

## 2025-02-04 PROBLEM — L82.1 SEBORRHEIC KERATOSIS: Status: ACTIVE | Noted: 2025-02-04

## 2025-02-04 PROBLEM — D18.00 HEMANGIOMA: Status: ACTIVE | Noted: 2025-02-04

## 2025-02-04 RX ORDER — PREDNISONE 20 MG/1
40 TABLET ORAL DAILY
Qty: 10 TABLET | Refills: 0 | Status: SHIPPED | OUTPATIENT
Start: 2025-02-04 | End: 2025-02-09

## 2025-02-04 RX ORDER — AZITHROMYCIN 250 MG/1
TABLET, FILM COATED ORAL
Qty: 6 TABLET | Refills: 0 | Status: SHIPPED | OUTPATIENT
Start: 2025-02-04 | End: 2025-02-09

## 2025-02-04 RX ORDER — OFLOXACIN 3 MG/ML
1 SOLUTION/ DROPS OPHTHALMIC 4 TIMES DAILY
Qty: 5 ML | Refills: 0 | Status: SHIPPED | OUTPATIENT
Start: 2025-02-04 | End: 2025-02-14

## 2025-02-04 RX ORDER — FAMOTIDINE 40 MG/1
1 TABLET, FILM COATED ORAL NIGHTLY
COMMUNITY
Start: 2024-12-04 | End: 2025-03-04

## 2025-02-04 ASSESSMENT — ENCOUNTER SYMPTOMS
CHILLS: 0
PHOTOPHOBIA: 0
EYE REDNESS: 1
VOMITING: 0
RHINORRHEA: 1
EYE ITCHING: 1
FEVER: 0
GASTROINTESTINAL NEGATIVE: 1
NAUSEA: 0
EYE DISCHARGE: 1
DIARRHEA: 0
ABDOMINAL PAIN: 0
COUGH: 0
SORE THROAT: 1
CARDIOVASCULAR NEGATIVE: 1
HEADACHES: 1
SINUS PRESSURE: 1

## 2025-02-04 ASSESSMENT — VISUAL ACUITY: OU: 1

## 2025-02-04 NOTE — PROGRESS NOTES
"Subjective   Patient ID: Rosie Sepulveda is a 59 y.o. female. They present today with a chief complaint of URI (10 days/ Sore throat, sinus pressure, nausea).    History of Present Illness  Subjective  History was provided by the patient.  Rosie Sepulveda is a 59 y.o. female who presents for evaluation of symptoms of a URI, possible sinusitis. Symptoms include headache, itching in eyes, post nasal drip, sinus and nasal congestion, and sore throat. Onset of symptoms was 10 days ago, unchanged since that time. Associated symptoms include bilateral ear pressure/pain. She is drinking moderate amounts of fluids. Evaluation to date: none. Treatment to date: none    Objective  /83 (BP Location: Left arm, Patient Position: Sitting, BP Cuff Size: Adult)   Pulse 69   Temp 36.7 °C (98.1 °F) (Oral)   Resp 18   Ht 1.676 m (5' 6\")   Wt 74.8 kg (165 lb)   SpO2 98%   BMI 26.63 kg/m²     Assessment/Plan  sinusitis    Discussed the diagnosis and treatment of sinusitis.  Suggested symptomatic OTC remedies.  Nasal saline spray for congestion.  Azithromycin per orders.  Follow up as needed.        History provided by:  Patient and medical records  URI  Presenting symptoms: congestion, ear pain, rhinorrhea and sore throat    Presenting symptoms: no cough and no fever    Associated symptoms: headaches        Past Medical History  Allergies as of 2025 - Reviewed 2025   Allergen Reaction Noted    Amoxicillin Itching 10/06/2023    Trimethoprim Hives 10/06/2023       (Not in a hospital admission)       Past Medical History:   Diagnosis Date    Anxiety     Arrhythmia     SVT    DVT (deep venous thrombosis) (Multi)     GERD (gastroesophageal reflux disease)     Nephrolithiasis     PONV (postoperative nausea and vomiting)     Skin tag        Past Surgical History:   Procedure Laterality Date     SECTION, LOW TRANSVERSE      COLONOSCOPY      DILATION AND CURETTAGE OF UTERUS      KNEE SURGERY      " "arthroscopic    SHOULDER SURGERY          reports that she has never smoked. She has never used smokeless tobacco. She reports current alcohol use of about 5.0 standard drinks of alcohol per week. She reports that she does not use drugs.    Review of Systems  Review of Systems   Constitutional:  Negative for chills and fever.   HENT:  Positive for congestion, ear pain, rhinorrhea, sinus pressure and sore throat.    Eyes:  Positive for discharge, redness and itching. Negative for photophobia and visual disturbance.   Respiratory:  Negative for cough.    Cardiovascular: Negative.    Gastrointestinal: Negative.  Negative for abdominal pain, diarrhea, nausea and vomiting.   Genitourinary: Negative.    Neurological:  Positive for headaches.   All other systems reviewed and are negative.                                 Objective    Vitals:    02/04/25 1106   BP: 128/83   BP Location: Left arm   Patient Position: Sitting   BP Cuff Size: Adult   Pulse: 69   Resp: 18   Temp: 36.7 °C (98.1 °F)   TempSrc: Oral   SpO2: 98%   Weight: 74.8 kg (165 lb)   Height: 1.676 m (5' 6\")     No LMP recorded. Patient is postmenopausal.    Physical Exam  Vitals and nursing note reviewed.   Constitutional:       General: She is not in acute distress.     Appearance: Normal appearance. She is ill-appearing.   HENT:      Head: Atraumatic.      Right Ear: A middle ear effusion is present. Tympanic membrane is bulging.      Left Ear: A middle ear effusion is present.      Nose: Mucosal edema, congestion and rhinorrhea present. Rhinorrhea is purulent.      Right Turbinates: Swollen.      Left Turbinates: Swollen.      Right Sinus: Frontal sinus tenderness present.      Left Sinus: Frontal sinus tenderness present.      Mouth/Throat:      Lips: Pink.      Mouth: Mucous membranes are moist.      Pharynx: Uvula midline. Posterior oropharyngeal erythema and postnasal drip present.   Eyes:      General: Lids are normal. Vision grossly intact. Gaze " aligned appropriately.         Right eye: No discharge.         Left eye: No discharge.      Extraocular Movements: Extraocular movements intact.      Conjunctiva/sclera:      Right eye: Right conjunctiva is injected. Exudate present.      Left eye: Left conjunctiva is injected. Exudate present.      Pupils: Pupils are equal, round, and reactive to light.   Cardiovascular:      Rate and Rhythm: Normal rate and regular rhythm.      Pulses: Normal pulses.      Heart sounds: Normal heart sounds.   Pulmonary:      Effort: Pulmonary effort is normal.      Breath sounds: Normal breath sounds and air entry. No decreased breath sounds or wheezing.   Musculoskeletal:      Cervical back: Normal range of motion and neck supple.   Skin:     General: Skin is warm and dry.      Capillary Refill: Capillary refill takes less than 2 seconds.   Neurological:      Mental Status: She is alert and oriented to person, place, and time.   Psychiatric:         Behavior: Behavior normal.         Procedures    Point of Care Test & Imaging Results from this visit  No results found for this visit on 02/04/25.   No results found.    Diagnostic study results (if any) were reviewed by SIMEON Mortensen.    Assessment/Plan   Allergies, medications, history, and pertinent labs/EKGs/Imaging reviewed by SIMEON Mortensen.     Medical Decision Making  Risks, benefits, and alternatives of the medications and treatment plan prescribed today were discussed, and patient expressed understanding. Plan follow up as discussed or as needed if any worsening symptoms or change in condition. Reinforced red flags including (but not limited to): severe or worsening pain; difficulty swallowing; stiff neck; shortness of breath; coughing or vomiting blood; chest pain; and new or increased fever are indications to go to the Emergency Department.  At time of discharge patient was clinically well-appearing and HDS for outpatient management. The  patient and/or family was educated regarding diagnosis, supportive care, OTC and Rx medications. The patient and/or family was given the opportunity to ask questions prior to discharge.  They verbalized understanding of my discussion of the plans for treatment, expected course, indications to return to  or seek further evaluation in ED, and the need for timely follow up as directed.   They were provided with a work/school excuse if requested. The after-visit summary was given to the patient and care instructions were reviewed with the patient. All questions were answered and the patient verbalized understanding of the plan of care for today.  Plan:  Recent visit notes reviewed  Meds as above  Increase clear fluids  Pcp follow up this week if not improving or worsening  ER visit anytime 24/7 for acute worsening or changing condition      Orders and Diagnoses  Diagnoses and all orders for this visit:  Acute non-recurrent frontal sinusitis  -     azithromycin (Zithromax) 250 mg tablet; Take 2 tablets (500 mg) by mouth once daily for 1 day, THEN 1 tablet (250 mg) once daily for 4 days.  -     predniSONE (Deltasone) 20 mg tablet; Take 2 tablets (40 mg) by mouth once daily for 5 days.  -     ofloxacin (Ocuflox) 0.3 % ophthalmic solution; Administer 1 drop into both eyes 4 times a day for 10 days.  Acute bacterial conjunctivitis of both eyes      Medical Admin Record      Patient disposition: Home    Electronically signed by SIMEON Mortensen  11:44 AM

## 2025-02-05 RX ORDER — DOXYCYCLINE 100 MG/1
100 CAPSULE ORAL 2 TIMES DAILY
Qty: 14 CAPSULE | Refills: 0 | Status: SHIPPED | OUTPATIENT
Start: 2025-02-05 | End: 2025-02-12

## 2025-02-13 ENCOUNTER — APPOINTMENT (OUTPATIENT)
Dept: CARDIOLOGY | Facility: CLINIC | Age: 60
End: 2025-02-13
Payer: OTHER GOVERNMENT

## 2025-08-26 ENCOUNTER — APPOINTMENT (OUTPATIENT)
Dept: CARDIOLOGY | Facility: CLINIC | Age: 60
End: 2025-08-26
Payer: OTHER GOVERNMENT

## 2025-08-26 VITALS
SYSTOLIC BLOOD PRESSURE: 110 MMHG | DIASTOLIC BLOOD PRESSURE: 54 MMHG | WEIGHT: 162.6 LBS | HEIGHT: 66 IN | BODY MASS INDEX: 26.13 KG/M2 | HEART RATE: 65 BPM

## 2025-08-26 DIAGNOSIS — Z86.16 HISTORY OF COVID-19: ICD-10-CM

## 2025-08-26 DIAGNOSIS — R00.2 PALPITATIONS: ICD-10-CM

## 2025-08-26 DIAGNOSIS — M24.20 EAGLE'S SYNDROME: ICD-10-CM

## 2025-08-26 DIAGNOSIS — M19.90 OSTEOARTHRITIS, UNSPECIFIED OSTEOARTHRITIS TYPE, UNSPECIFIED SITE: ICD-10-CM

## 2025-08-26 DIAGNOSIS — I82.462 DEEP VEIN THROMBOSIS (DVT) OF CALF MUSCLE VEIN OF LEFT LOWER EXTREMITY, UNSPECIFIED CHRONICITY: ICD-10-CM

## 2025-08-26 DIAGNOSIS — I10 ESSENTIAL HYPERTENSION: ICD-10-CM

## 2025-08-26 DIAGNOSIS — F41.9 ANXIETY: ICD-10-CM

## 2025-08-26 DIAGNOSIS — K21.9 GASTROESOPHAGEAL REFLUX DISEASE WITHOUT ESOPHAGITIS: ICD-10-CM

## 2025-08-26 DIAGNOSIS — R07.9 CHEST PAIN, UNSPECIFIED TYPE: ICD-10-CM

## 2025-08-26 DIAGNOSIS — G56.03 BILATERAL CARPAL TUNNEL SYNDROME: ICD-10-CM

## 2025-08-26 DIAGNOSIS — I47.10 SVT (SUPRAVENTRICULAR TACHYCARDIA): Primary | ICD-10-CM

## 2025-08-26 PROCEDURE — 3008F BODY MASS INDEX DOCD: CPT | Performed by: INTERNAL MEDICINE

## 2025-08-26 PROCEDURE — 3078F DIAST BP <80 MM HG: CPT | Performed by: INTERNAL MEDICINE

## 2025-08-26 PROCEDURE — 3074F SYST BP LT 130 MM HG: CPT | Performed by: INTERNAL MEDICINE

## 2025-08-26 PROCEDURE — 99214 OFFICE O/P EST MOD 30 MIN: CPT | Performed by: INTERNAL MEDICINE

## 2025-08-26 PROCEDURE — 1036F TOBACCO NON-USER: CPT | Performed by: INTERNAL MEDICINE

## 2025-08-26 PROCEDURE — 93000 ELECTROCARDIOGRAM COMPLETE: CPT | Performed by: INTERNAL MEDICINE

## 2025-08-26 RX ORDER — DILTIAZEM HYDROCHLORIDE 120 MG/1
120 CAPSULE, COATED, EXTENDED RELEASE ORAL DAILY
Qty: 90 CAPSULE | Refills: 3 | Status: SHIPPED | OUTPATIENT
Start: 2025-08-26 | End: 2026-08-26

## 2026-08-20 ENCOUNTER — APPOINTMENT (OUTPATIENT)
Dept: CARDIOLOGY | Facility: CLINIC | Age: 61
End: 2026-08-20
Payer: OTHER GOVERNMENT

## (undated) DEVICE — BLADE, STRYKER, 4.0MM, AGG PLUS SHVBLD ULTMT

## (undated) DEVICE — TUBESET, INTERMEDIARY

## (undated) DEVICE — GLOVE, SURGICAL, PROTEXIS PI , 7.5, PF, LF

## (undated) DEVICE — SOLUTION, IRRIGATION, USP, SODIUM CHLORIDE 0.9%, 3000 ML

## (undated) DEVICE — DRAPE, SHEET, U, STERI DRAPE, 47 X 51 IN, DISPOSABLE, STERILE

## (undated) DEVICE — SYRINGE, 60 CC, LUER LOCK, MONOJECT, W/O CAP, LF

## (undated) DEVICE — DRESSING, ABDOMINAL, WET PRUF, TENDERSORB, 5 X 9 IN, STERILE

## (undated) DEVICE — SOLUTION, IRRIGATION, STERILE WATER, 1000 ML, POUR BOTTLE

## (undated) DEVICE — DRAPE, SHEET, THREE QUARTER, FAN FOLD, 57 X 77 IN

## (undated) DEVICE — SUTURE SYSTEM, EXPRESSEW III NEEDLES

## (undated) DEVICE — VAPR COOLPULSE, 90 ELECTRODE W/HAND CONTROL

## (undated) DEVICE — SUTURE, MONOCRYL, 3-0, 18 IN, PS2, UNDYED

## (undated) DEVICE — TOWEL PACK, STERILE, 4/PACK, BLUE

## (undated) DEVICE — BUR, STRYKER, 4.0MM, BARREL, 12 FLUTE, F-SERIES

## (undated) DEVICE — PAD, GROUNDING, ELECTROSURGICAL, DUAL

## (undated) DEVICE — Device

## (undated) DEVICE — GLOVE, SURGICAL, PROTEXIS PI MICRO, 7.5, PF, LF

## (undated) DEVICE — DRAPE, INSTRUMENT, W/POUCH, STERI DRAPE, 7 X 11 IN, DISPOSABLE, STERILE

## (undated) DEVICE — TUBING, SUCTION, CONNECTING, 9/32 X 10FT, LF

## (undated) DEVICE — KIT, BEACH CHAIR TRIMANO

## (undated) DEVICE — TUBING, NFLOW, FMS VUE, SNGL USE, DISP, LF

## (undated) DEVICE — CANNULA, CLEAR SYSTEM, 5.5M X 75M, SMOOTH, W/ DISTAL RIB

## (undated) DEVICE — DRESSING, NON-ADHERENT, 3 X 3 IN, STERILE

## (undated) DEVICE — SOLUTION, IRRIGATION, SODIUM CHLORIDE 0.9%, 1000 ML, POUR BOTTLE

## (undated) DEVICE — HEAD POSITIONER, UNIVERSAL, DISP